# Patient Record
Sex: FEMALE | Race: ASIAN | NOT HISPANIC OR LATINO | Employment: UNEMPLOYED | URBAN - METROPOLITAN AREA
[De-identification: names, ages, dates, MRNs, and addresses within clinical notes are randomized per-mention and may not be internally consistent; named-entity substitution may affect disease eponyms.]

---

## 2019-04-29 ENCOUNTER — CONSULT (OUTPATIENT)
Dept: PERINATAL CARE | Facility: CLINIC | Age: 39
End: 2019-04-29
Payer: COMMERCIAL

## 2019-04-29 VITALS
BODY MASS INDEX: 18.82 KG/M2 | HEIGHT: 63 IN | WEIGHT: 106.2 LBS | SYSTOLIC BLOOD PRESSURE: 95 MMHG | HEART RATE: 78 BPM | DIASTOLIC BLOOD PRESSURE: 64 MMHG

## 2019-04-29 DIAGNOSIS — Z98.890 HISTORY OF MYOMECTOMY: ICD-10-CM

## 2019-04-29 DIAGNOSIS — O09.519 ADVANCED MATERNAL AGE, PRIMIGRAVIDA, ANTEPARTUM: ICD-10-CM

## 2019-04-29 DIAGNOSIS — Z31.69 ENCOUNTER FOR PRECONCEPTION CONSULTATION: Primary | ICD-10-CM

## 2019-04-29 PROCEDURE — 99204 OFFICE O/P NEW MOD 45 MIN: CPT | Performed by: OBSTETRICS & GYNECOLOGY

## 2019-07-05 ENCOUNTER — TELEPHONE (OUTPATIENT)
Dept: PERINATAL CARE | Facility: CLINIC | Age: 39
End: 2019-07-05

## 2019-07-05 NOTE — TELEPHONE ENCOUNTER
----- Message from Himanshu Fried RN sent at 7/5/2019  8:36 AM EDT -----  I spoke with [facility] and her   Her  states that they had the labs drawn with the prescription that was given to them by Dr Cosme Lees with [facility]  Thanks,   Elena Barillas     ----- Message -----  From: Paras Joshi MD  Sent: 7/3/2019   3:57 PM EDT  To: Himanshu Fried RN    Elba -   Can you call this patient to see who did her HBsAg test?  I ordered it for her in April but never got results, and the Health Dept called back as it apparently was positive, so I"m thinking her PCP did it, but I never saw results  Thank you! She is someone I saw for preconception  shaggy    ----- Message -----  From: Kristin Pavon RN  Sent: 7/3/2019   3:04 PM EDT  To: MD Kim Rodgers from the [place] DEPT of Health called with questions regarding [facility]  She would like to know if she is pregnant or not,also is her Hepatitis B acute or chronic   Please call her at [phone],GYH may leave a VM   Thanks so much,Linda

## 2020-03-05 ENCOUNTER — TELEPHONE (OUTPATIENT)
Dept: PERINATAL CARE | Facility: OTHER | Age: 40
End: 2020-03-05

## 2020-03-11 LAB
EXTERNAL ABO GROUPING: NORMAL
EXTERNAL CHLAMYDIA SCREEN: NEGATIVE
EXTERNAL GONORRHEA SCREEN: NEGATIVE
EXTERNAL HEMATOCRIT: 39.7 %
EXTERNAL HEMOGLOBIN: 12.8 G/DL
EXTERNAL HEPATITIS B SURFACE ANTIGEN: NORMAL
EXTERNAL HIV SCREEN: NORMAL
EXTERNAL HIV-1/2 AB-AG: NORMAL
EXTERNAL PLATELET COUNT: 195 K/ΜL
EXTERNAL RH FACTOR: POSITIVE
EXTERNAL RUBELLA IGG QUANTITATION: NORMAL
EXTERNAL SYPHILIS RPR SCREEN: NORMAL
HCV AB SER-ACNC: NORMAL

## 2020-03-22 ENCOUNTER — TELEMEDICINE (OUTPATIENT)
Dept: PERINATAL CARE | Facility: CLINIC | Age: 40
End: 2020-03-22
Payer: COMMERCIAL

## 2020-03-22 VITALS — WEIGHT: 102 LBS | BODY MASS INDEX: 18.07 KG/M2

## 2020-03-22 DIAGNOSIS — R63.6 UNDERWEIGHT: ICD-10-CM

## 2020-03-22 DIAGNOSIS — Z3A.08 8 WEEKS GESTATION OF PREGNANCY: ICD-10-CM

## 2020-03-22 DIAGNOSIS — O09.519 ADVANCED MATERNAL AGE, PRIMIGRAVIDA, ANTEPARTUM: ICD-10-CM

## 2020-03-22 DIAGNOSIS — O98.419 MATERNAL HBSAG (HEPATITIS B SURFACE ANTIGEN) CARRIER (HCC): ICD-10-CM

## 2020-03-22 DIAGNOSIS — B18.1 MATERNAL HBSAG (HEPATITIS B SURFACE ANTIGEN) CARRIER (HCC): ICD-10-CM

## 2020-03-22 DIAGNOSIS — Z31.69 ENCOUNTER FOR PRECONCEPTION CONSULTATION: Primary | ICD-10-CM

## 2020-03-22 DIAGNOSIS — O34.29 PREGNANCY WITH HISTORY OF UTERINE MYOMECTOMY: ICD-10-CM

## 2020-03-22 PROCEDURE — 99423 OL DIG E/M SVC 21+ MIN: CPT | Performed by: OBSTETRICS & GYNECOLOGY

## 2020-03-22 NOTE — ASSESSMENT & PLAN NOTE
Had genetic counseling previously (outside of pregnancy), discussed repeating in context of pregnancy to discuss genetic screening and diagnostic options  They will arrange through their local OB  Advised consideration of aspirin 162mg daily aspirin around 11-12 weeks to reduce risk of preeclampsia given moderate risk factors of nulliparity and AMA

## 2020-03-22 NOTE — LETTER
March 26, 2020     KEREN Jaquez and [address] [facility] [address]    Patient: Nancy Florez   YOB: 1980   Date of Visit: 3/22/2020       Dear Dr Khushi Hagan Recipients: Thank you for referring Nancy Florez to me for evaluation  Below are my notes for this consultation  If you have questions, please do not hesitate to call me  I look forward to following your patient along with you  Sincerely,        Caty Thompson MD        CC: No Recipients  Caty Thompson MD  3/22/2020  6:56 PM  Sign at close encounter  Virtual Regular Visit    Reason for visit is patient request of followup after initial preconception visit 4/2019  Tony Torrez reached out wanting to check in  Issa Jimenez is expecting! They've [facility] and have their intake 4/1/20  She had a dating scan 3/12/20, they showed me the images by telemed, CRL 0 33cm but printed image did not show the correlating [address] but they were told the dating was concordant by LMP (charted) suggesting 8w2d today w ED 10/30/20  Encounter provider Caty Thompson MD    Provider primarily located at [address] [place] [id number] [phone]      Recent Visits  No visits were found meeting these conditions  Showing recent visits within past 7 days and meeting all other requirements     Future Appointments  No visits were found meeting these conditions  Showing future appointments within next 150 days and meeting all other requirements      After connecting through [employer], the patient was identified by name and date of birth  Nancy Florez was informed that this is a telemedicine visit and that the visit is being conducted through [employer] which may not be secure and therefore, might not be HIPAA-compliant  My home office door was closed  No one else was in the room    She acknowledged consent and understanding of privacy and security of the video platform  The patient has agreed to participate and understands they can discontinue the visit at any time  Subjective  Tracey Beltrán is a 45 y o  female with complaints of mild nausea but tolerating the new pregnancy well  Past Medical History:   Diagnosis Date    Fibroid     Miscarriage     , early, did not require surgery  Past Surgical History:   Procedure Laterality Date    APPENDECTOMY      2004    MYOMECTOMY ABDOMINAL APPROACH  2019    In [facility] on 19  Working on getting operative note and will try to get it translated to [employer] [facility]  Indicates pathology negative for carcinoma  Symptoms were enlarging abdomen and bulk symptoms  Fibroid measurements on ultrasound image they showed me were 5 59x7 39cm though they believe dimensions >10cm  They were told to wait 6 months to conceive by one doctor and 12 months by another  Current Outpatient Medications   Medication Sig Dispense Refill    Ferrous Gluconate (IRON 27 PO) Take by mouth      Multiple Vitamins-Minerals (WOMENS MULTI PO) Take by mouth       No current facility-administered medications for this visit  No Known Allergies    OB History    Para Term  AB Living   2 0 0 0 1 0   SAB TAB Ectopic Multiple Live Births   1 0 0 0 0      # Outcome Date GA Lbr Moustapha/2nd Weight Sex Delivery Anes PTL Lv   2 Current            1 SAB 2006               Social History     Tobacco Use    Smoking status: Never Smoker    Smokeless tobacco: Never Used   Substance Use Topics    Alcohol use: Yes     Frequency: Monthly or less    Drug use: Never      Family History   Problem Relation Age of Onset    No Known Problems Mother     Heart disease Father      Assessment/Plan  Ms Janneth Celis is a 45y o  year-old Katherin Netters with an BRENDA of 10/30/2020, by Last Menstrual Period at [address] [place] [address] who desired telemedicine checkin prior to transition to local  care    By issue:    Problem List Items Addressed This Visit        Other    Pregnancy with history of uterine myomectomy     Will plan   Discussed timing recommendations previously, 37 weeks is reasonable, exact timing TBD by primary OB  Encounter for preconception consultation - Primary     Newly pregnant! Congratulated them  Advanced maternal age, primigravida, antepartum     Had genetic counseling previously (outside of pregnancy), discussed repeating in context of pregnancy to discuss genetic screening and diagnostic options  They will arrange through their local OB  Advised consideration of aspirin 162mg daily aspirin around 11-12 weeks to reduce risk of preeclampsia given moderate risk factors of nulliparity and AMA  Maternal HBsAg (hepatitis B surface antigen) carrier [facility])     She is not certain if HBV labs were drawn with initial prenatal labs; I encouraged her to remind team to check HBV viral load and HBeAg, most relevant in third trimester to help determine whether tenofovir is advised for reduction of vertical transmission  Inadvertently did not discuss HBIG and vaccination for the  but that will be addressed through her primary OB  8 weeks gestation of pregnancy     Doing well overall with nausea, tolerating prenatal vitamins, discussed strategies if iron becomes an issue with constipation  Discussed general timing of MFM visits which is likely different at [facility] and likely to be heavily impacted by COVID  They did very well by telemedicine and would do well with this in the future I suspect  Would anticipate NT, anatomic survey, and a growth assessment  Underweight     Discussed recommended weight gain of 28-40# this pregnancy based on starting BMI  I spent 35 minutes with the patient during this visit  Will route this note to her primary OB as requested       Kindra Ashley MD  Attending Physician, Maternal-Fetal Medicine  [facility]

## 2020-03-22 NOTE — ASSESSMENT & PLAN NOTE
She is not certain if HBV labs were drawn with initial prenatal labs; I encouraged her to remind team to check HBV viral load and HBeAg, most relevant in third trimester to help determine whether tenofovir is advised for reduction of vertical transmission  Inadvertently did not discuss HBIG and vaccination for the  but that will be addressed through her primary OB

## 2020-03-22 NOTE — ASSESSMENT & PLAN NOTE
Doing well overall with nausea, tolerating prenatal vitamins, discussed strategies if iron becomes an issue with constipation  Discussed general timing of MFM visits which is likely different at [facility] and likely to be heavily impacted by COVID  They did very well by telemedicine and would do well with this in the future I suspect  Would anticipate NT, anatomic survey, and a growth assessment

## 2020-03-22 NOTE — PROGRESS NOTES
Virtual Regular Visit    Reason for visit is patient request of followup after initial preconception visit 4/2019  Giulia Ceballos reached out wanting to check in  Isabella Sparks is expecting! They've established care at [facility] and have their intake 4/1/20  She had a dating scan 3/12/20, they showed me the images by telemed, CRL 0 33cm but printed image did not show the correlating [address] but they were told the dating was concordant by LMP (charted) suggesting 8w2d today w ED 10/30/20  Encounter provider Waldron Dubin, MD    Provider primarily located at [address] [place] [id number] [phone]      Recent Visits  No visits were found meeting these conditions  Showing recent visits within past 7 days and meeting all other requirements     Future Appointments  No visits were found meeting these conditions  Showing future appointments within next 150 days and meeting all other requirements      After connecting through [employer], the patient was identified by name and date of birth  Alma Artis was informed that this is a telemedicine visit and that the visit is being conducted through [employer] which may not be secure and therefore, might not be HIPAA-compliant  My home office door was closed  No one else was in the room  She acknowledged consent and understanding of privacy and security of the video platform  The patient has agreed to participate and understands they can discontinue the visit at any time  Subjective  Alma Artis is a 45 y o  female with complaints of mild nausea but tolerating the new pregnancy well  Past Medical History:   Diagnosis Date    Fibroid     Miscarriage     2006, early, did not require surgery  Past Surgical History:   Procedure Laterality Date    APPENDECTOMY      2004    MYOMECTOMY ABDOMINAL APPROACH  02/13/2019    In [facility] on 2/13/19    Working on getting operative note and will try to get it translated to [place]  Indicates pathology negative for carcinoma  Symptoms were enlarging abdomen and bulk symptoms  Fibroid measurements on ultrasound image they showed me were 5 59x7 39cm though they believe dimensions >10cm  They were told to wait 6 months to conceive by one doctor and 12 months by another  Current Outpatient Medications   Medication Sig Dispense Refill    Ferrous Gluconate (IRON 27 PO) Take by mouth      Multiple Vitamins-Minerals (WOMENS MULTI PO) Take by mouth       No current facility-administered medications for this visit  No Known Allergies    OB History    Para Term  AB Living   2 0 0 0 1 0   SAB TAB Ectopic Multiple Live Births   1 0 0 0 0      # Outcome Date GA Lbr Moustapha/2nd Weight Sex Delivery Anes PTL Lv   2 Current            1 2006               Social History     Tobacco Use    Smoking status: Never Smoker    Smokeless tobacco: Never Used   Substance Use Topics    Alcohol use: Yes     Frequency: Monthly or less    Drug use: Never      Family History   Problem Relation Age of Onset    No Known Problems Mother     Heart disease Father      Assessment/Plan  Ms Adam Jay is a 45y o  year-old Vastari Serve with an BRENDA of 10/30/2020, by Last Menstrual Period at [address] [place] [address] who desired telemedicine checkin prior to transition to local  care  By issue:    Problem List Items Addressed This Visit        Other    Pregnancy with history of uterine myomectomy     Will plan   Discussed timing recommendations previously, 37 weeks is reasonable, exact timing TBD by primary OB  Encounter for preconception consultation - Primary     Newly pregnant! Congratulated them  Advanced maternal age, primigravida, antepartum     Had genetic counseling previously (outside of pregnancy), discussed repeating in context of pregnancy to discuss genetic screening and diagnostic options    They will arrange through their local OB     Advised consideration of aspirin 162mg daily aspirin around 11-12 weeks to reduce risk of preeclampsia given moderate risk factors of nulliparity and AMA  Maternal HBsAg (hepatitis B surface antigen) carrier [facility])     She is not certain if HBV labs were drawn with initial prenatal labs; I encouraged her to remind team to check HBV viral load and HBeAg, most relevant in third trimester to help determine whether tenofovir is advised for reduction of vertical transmission  Inadvertently did not discuss HBIG and vaccination for the  but that will be addressed through her primary OB  8 weeks gestation of pregnancy     Doing well overall with nausea, tolerating prenatal vitamins, discussed strategies if iron becomes an issue with constipation  Discussed general timing of MFM visits which is likely different at [facility] and likely to be heavily impacted by COVID  They did very well by telemedicine and would do well with this in the future I suspect  Would anticipate NT, anatomic survey, and a growth assessment  Underweight     Discussed recommended weight gain of 28-40# this pregnancy based on starting BMI  I spent 35 minutes with the patient during this visit  Will route this note to her primary OB as requested       Mariely Carlin MD  Attending Physician, [place]

## 2020-03-22 NOTE — ASSESSMENT & PLAN NOTE
Will plan   Discussed timing recommendations previously, 37 weeks is reasonable, exact timing TBD by primary OB

## 2020-03-25 ENCOUNTER — TELEPHONE (OUTPATIENT)
Dept: PERINATAL CARE | Facility: CLINIC | Age: 40
End: 2020-03-25

## 2020-03-25 NOTE — TELEPHONE ENCOUNTER
Left message for pt to call us back with the fax number for her OB so that Dr Ralph Escalante can route her telemedicine visit note to them

## 2020-03-26 ENCOUNTER — TELEPHONE (OUTPATIENT)
Dept: PERINATAL CARE | Facility: OTHER | Age: 40
End: 2020-03-26

## 2020-03-26 NOTE — TELEPHONE ENCOUNTER
Pt's  called to give me the fax # [phone] for [facility]  And also the phone # [phone]

## 2020-03-27 ENCOUNTER — DOCUMENTATION (OUTPATIENT)
Dept: PERINATAL CARE | Facility: OTHER | Age: 40
End: 2020-03-27

## 2020-03-27 NOTE — PROGRESS NOTES
Received a staff message from Dr Jo Anthony requesting if I could call to speak with family to find out what their question they called the office about on 3/26/2020  I called and spoke with Freida Brewster   He states he saw Dr Jo Anthony for genetic testing and pre pregnancy consult in 2019  He said they were sent for a fertility specialist, and a TSH level was drawn in 2019  He states the level was 3 7 and was recommended to start on Synthroid 25 mcg daily  Tiera Gandhi states another TSH was drawn in 2019 and the level was 2 1  He states they were advised to not take the medication anymore  She had her LMP on 2020 and [facility] [place]  On 3/11/2020 she had a TSH level of 4 4  Tiera Gandhi states he was wondering if she should restart the synthroid and at what dose  Further through the conversation Tiera Gandhi states that he had her take 25mcg of Synthroid this morning "as he is a doctor and did his own research "    Tiera Gandhi is also looking for recommendations for a doctor for [facility] to continue her prenatal care with  He states she will need a  as she has a history of fibroid surgery  Tiera Gandhi is also requesting to only see Dr Jo Anthony at [place] [address], advised that in light of COVID 19, our physician schedules are ever changing and there is no guarantee of this  He was receptive  He states their next appointment is 2020 at [place] [address] in [place]  Tiera Gandhi states he would like a call from Dr Jo Anthony directly to address these concerns and questions  Message routed to DR Jo Anthony

## 2020-03-28 NOTE — PROGRESS NOTES
Called pt back, left message indicating no need to restart synthroid at this time and would defer management to her 4/1/20 [facility] appt at [facility]  Also indicated I don't have any specific recommendations on who to deliver her (no one specific per Dr Jeovanny Mohamud as it has been a while) and they could feel out the [facility] practice 4/1/20 at their upcoming appt    Raul Bryson MD

## 2020-04-07 ENCOUNTER — OB ABSTRACT (OUTPATIENT)
Dept: OBGYN CLINIC | Facility: MEDICAL CENTER | Age: 40
End: 2020-04-07

## 2020-04-07 DIAGNOSIS — O09.511 HIGH-RISK PREGNANCY, PRIMIGRAVIDA OF ADVANCED MATERNAL AGE IN FIRST TRIMESTER: ICD-10-CM

## 2020-04-07 DIAGNOSIS — E03.9 HYPOTHYROIDISM AFFECTING PREGNANCY IN FIRST TRIMESTER: Primary | ICD-10-CM

## 2020-04-07 DIAGNOSIS — O99.281 HYPOTHYROIDISM AFFECTING PREGNANCY IN FIRST TRIMESTER: Primary | ICD-10-CM

## 2020-04-07 RX ORDER — LEVOTHYROXINE SODIUM 0.03 MG/1
25 TABLET ORAL DAILY
COMMUNITY
End: 2020-07-08 | Stop reason: SDUPTHER

## 2020-04-07 RX ORDER — LEVOTHYROXINE SODIUM 0.03 MG/1
25 TABLET ORAL DAILY
Qty: 30 TABLET | Refills: 2 | Status: SHIPPED | OUTPATIENT
Start: 2020-04-07 | End: 2020-04-15 | Stop reason: SDUPTHER

## 2020-04-15 ENCOUNTER — INITIAL PRENATAL (OUTPATIENT)
Dept: OBGYN CLINIC | Facility: MEDICAL CENTER | Age: 40
End: 2020-04-15

## 2020-04-15 VITALS — BODY MASS INDEX: 18.25 KG/M2 | WEIGHT: 103 LBS | DIASTOLIC BLOOD PRESSURE: 62 MMHG | SYSTOLIC BLOOD PRESSURE: 90 MMHG

## 2020-04-15 DIAGNOSIS — O09.519 ADVANCED MATERNAL AGE, PRIMIGRAVIDA, ANTEPARTUM: ICD-10-CM

## 2020-04-15 DIAGNOSIS — E03.9 HYPOTHYROIDISM AFFECTING PREGNANCY IN FIRST TRIMESTER: ICD-10-CM

## 2020-04-15 DIAGNOSIS — O99.281 HYPOTHYROIDISM AFFECTING PREGNANCY IN FIRST TRIMESTER: ICD-10-CM

## 2020-04-15 DIAGNOSIS — Z3A.11 11 WEEKS GESTATION OF PREGNANCY: Primary | ICD-10-CM

## 2020-04-15 DIAGNOSIS — O34.29 PREGNANCY WITH HISTORY OF UTERINE MYOMECTOMY: ICD-10-CM

## 2020-04-15 DIAGNOSIS — Z34.91 FIRST TRIMESTER PREGNANCY: ICD-10-CM

## 2020-04-15 DIAGNOSIS — R63.6 UNDERWEIGHT: ICD-10-CM

## 2020-04-15 PROCEDURE — PNV: Performed by: OBSTETRICS & GYNECOLOGY

## 2020-04-17 ENCOUNTER — TELEPHONE (OUTPATIENT)
Dept: PERINATAL CARE | Facility: CLINIC | Age: 40
End: 2020-04-17

## 2020-04-19 PROBLEM — Z3A.12 12 WEEKS GESTATION OF PREGNANCY: Status: ACTIVE | Noted: 2020-03-22

## 2020-04-20 ENCOUNTER — ROUTINE PRENATAL (OUTPATIENT)
Dept: PERINATAL CARE | Facility: CLINIC | Age: 40
End: 2020-04-20
Payer: COMMERCIAL

## 2020-04-20 VITALS
HEIGHT: 63 IN | HEART RATE: 92 BPM | BODY MASS INDEX: 18.92 KG/M2 | WEIGHT: 106.8 LBS | TEMPERATURE: 97.2 F | DIASTOLIC BLOOD PRESSURE: 58 MMHG | SYSTOLIC BLOOD PRESSURE: 124 MMHG

## 2020-04-20 DIAGNOSIS — Z3A.12 12 WEEKS GESTATION OF PREGNANCY: ICD-10-CM

## 2020-04-20 DIAGNOSIS — O34.10 UTERINE FIBROID IN PREGNANCY: ICD-10-CM

## 2020-04-20 DIAGNOSIS — O98.419 MATERNAL HBSAG (HEPATITIS B SURFACE ANTIGEN) CARRIER (HCC): ICD-10-CM

## 2020-04-20 DIAGNOSIS — D25.9 UTERINE FIBROID IN PREGNANCY: ICD-10-CM

## 2020-04-20 DIAGNOSIS — O09.519 ADVANCED MATERNAL AGE, PRIMIGRAVIDA, ANTEPARTUM: ICD-10-CM

## 2020-04-20 DIAGNOSIS — R63.6 UNDERWEIGHT: ICD-10-CM

## 2020-04-20 DIAGNOSIS — B18.1 MATERNAL HBSAG (HEPATITIS B SURFACE ANTIGEN) CARRIER (HCC): ICD-10-CM

## 2020-04-20 DIAGNOSIS — O34.29 PREGNANCY WITH HISTORY OF UTERINE MYOMECTOMY: Primary | ICD-10-CM

## 2020-04-20 DIAGNOSIS — O09.511 HIGH-RISK PREGNANCY, PRIMIGRAVIDA OF ADVANCED MATERNAL AGE IN FIRST TRIMESTER: ICD-10-CM

## 2020-04-20 PROCEDURE — 76813 OB US NUCHAL MEAS 1 GEST: CPT | Performed by: OBSTETRICS & GYNECOLOGY

## 2020-04-20 PROCEDURE — 99212 OFFICE O/P EST SF 10 MIN: CPT | Performed by: OBSTETRICS & GYNECOLOGY

## 2020-04-20 PROCEDURE — 76801 OB US < 14 WKS SINGLE FETUS: CPT | Performed by: OBSTETRICS & GYNECOLOGY

## 2020-04-28 ENCOUNTER — PATIENT MESSAGE (OUTPATIENT)
Dept: PERINATAL CARE | Facility: CLINIC | Age: 40
End: 2020-04-28

## 2020-04-28 LAB — TSH SERPL-ACNC: 1.65 MIU/L

## 2020-05-15 ENCOUNTER — ROUTINE PRENATAL (OUTPATIENT)
Dept: OBGYN CLINIC | Facility: MEDICAL CENTER | Age: 40
End: 2020-05-15

## 2020-05-15 ENCOUNTER — APPOINTMENT (OUTPATIENT)
Dept: LAB | Facility: MEDICAL CENTER | Age: 40
End: 2020-05-15
Payer: COMMERCIAL

## 2020-05-15 ENCOUNTER — TRANSCRIBE ORDERS (OUTPATIENT)
Dept: LAB | Facility: MEDICAL CENTER | Age: 40
End: 2020-05-15

## 2020-05-15 VITALS — BODY MASS INDEX: 18.58 KG/M2 | WEIGHT: 104.9 LBS | SYSTOLIC BLOOD PRESSURE: 92 MMHG | DIASTOLIC BLOOD PRESSURE: 62 MMHG

## 2020-05-15 DIAGNOSIS — Z36.9 UNSPECIFIED ANTENATAL SCREENING: ICD-10-CM

## 2020-05-15 DIAGNOSIS — Z33.1 PREGNANT STATE, INCIDENTAL: Primary | ICD-10-CM

## 2020-05-15 DIAGNOSIS — E03.9 HYPOTHYROIDISM AFFECTING PREGNANCY IN SECOND TRIMESTER: ICD-10-CM

## 2020-05-15 DIAGNOSIS — Z33.1 PREGNANT STATE, INCIDENTAL: ICD-10-CM

## 2020-05-15 DIAGNOSIS — O34.10 UTERINE FIBROID IN PREGNANCY: ICD-10-CM

## 2020-05-15 DIAGNOSIS — D25.9 UTERINE FIBROID IN PREGNANCY: ICD-10-CM

## 2020-05-15 DIAGNOSIS — O99.282 HYPOTHYROIDISM AFFECTING PREGNANCY IN SECOND TRIMESTER: ICD-10-CM

## 2020-05-15 DIAGNOSIS — O09.519 ADVANCED MATERNAL AGE, PRIMIGRAVIDA, ANTEPARTUM: Primary | ICD-10-CM

## 2020-05-15 PROCEDURE — PNV: Performed by: OBSTETRICS & GYNECOLOGY

## 2020-05-15 PROCEDURE — 36415 COLL VENOUS BLD VENIPUNCTURE: CPT

## 2020-05-15 PROCEDURE — 82105 ALPHA-FETOPROTEIN SERUM: CPT

## 2020-05-19 ENCOUNTER — TELEPHONE (OUTPATIENT)
Dept: PERINATAL CARE | Facility: CLINIC | Age: 40
End: 2020-05-19

## 2020-05-19 LAB
2ND TRIMESTER 4 SCREEN SERPL-IMP: NORMAL
AFP ADJ MOM SERPL: 0.84
AFP INTERP AMN-IMP: NORMAL
AFP INTERP SERPL-IMP: NORMAL
AFP INTERP SERPL-IMP: NORMAL
AFP SERPL-MCNC: 34.8 NG/ML
AGE AT DELIVERY: 39.7 YR
GA METHOD: NORMAL
GA: 16 WEEKS
IDDM PATIENT QL: NO
MULTIPLE PREGNANCY: NO
NEURAL TUBE DEFECT RISK FETUS: NORMAL %

## 2020-06-11 ENCOUNTER — TELEPHONE (OUTPATIENT)
Dept: PERINATAL CARE | Facility: CLINIC | Age: 40
End: 2020-06-11

## 2020-06-11 PROBLEM — Z3A.20 20 WEEKS GESTATION OF PREGNANCY: Status: ACTIVE | Noted: 2020-03-22

## 2020-06-11 PROBLEM — B18.1 MATERNAL HBSAG (HEPATITIS B SURFACE ANTIGEN) CARRIER (HCC): Status: RESOLVED | Noted: 2020-04-20 | Resolved: 2020-06-11

## 2020-06-11 PROBLEM — O98.419 MATERNAL HBSAG (HEPATITIS B SURFACE ANTIGEN) CARRIER (HCC): Status: RESOLVED | Noted: 2020-04-20 | Resolved: 2020-06-11

## 2020-06-12 ENCOUNTER — ROUTINE PRENATAL (OUTPATIENT)
Dept: OBGYN CLINIC | Facility: MEDICAL CENTER | Age: 40
End: 2020-06-12

## 2020-06-12 ENCOUNTER — APPOINTMENT (OUTPATIENT)
Dept: LAB | Facility: MEDICAL CENTER | Age: 40
End: 2020-06-12
Payer: COMMERCIAL

## 2020-06-12 ENCOUNTER — ROUTINE PRENATAL (OUTPATIENT)
Dept: PERINATAL CARE | Facility: CLINIC | Age: 40
End: 2020-06-12
Payer: COMMERCIAL

## 2020-06-12 VITALS — DIASTOLIC BLOOD PRESSURE: 58 MMHG | BODY MASS INDEX: 19.33 KG/M2 | WEIGHT: 109.1 LBS | SYSTOLIC BLOOD PRESSURE: 100 MMHG

## 2020-06-12 VITALS
HEART RATE: 84 BPM | HEIGHT: 63 IN | TEMPERATURE: 98.8 F | SYSTOLIC BLOOD PRESSURE: 102 MMHG | DIASTOLIC BLOOD PRESSURE: 57 MMHG | WEIGHT: 111.4 LBS | BODY MASS INDEX: 19.74 KG/M2

## 2020-06-12 DIAGNOSIS — O34.10 UTERINE FIBROID IN PREGNANCY: ICD-10-CM

## 2020-06-12 DIAGNOSIS — O09.519 ADVANCED MATERNAL AGE, PRIMIGRAVIDA, ANTEPARTUM: ICD-10-CM

## 2020-06-12 DIAGNOSIS — Z34.92 SECOND TRIMESTER PREGNANCY: Primary | ICD-10-CM

## 2020-06-12 DIAGNOSIS — I51.9 MYXEDEMA HEART DISEASE: ICD-10-CM

## 2020-06-12 DIAGNOSIS — O99.281 MATERNAL HYPERTHYROIDISM, ANTEPARTUM, FIRST TRIMESTER: Primary | ICD-10-CM

## 2020-06-12 DIAGNOSIS — O34.29 PREGNANCY WITH HISTORY OF UTERINE MYOMECTOMY: Primary | ICD-10-CM

## 2020-06-12 DIAGNOSIS — Z36.86 ENCOUNTER FOR ANTENATAL SCREENING FOR CERVICAL LENGTH: ICD-10-CM

## 2020-06-12 DIAGNOSIS — Z36.3 ENCOUNTER FOR ANTENATAL SCREENING FOR MALFORMATIONS: ICD-10-CM

## 2020-06-12 DIAGNOSIS — E05.90 MATERNAL HYPERTHYROIDISM, ANTEPARTUM, FIRST TRIMESTER: Primary | ICD-10-CM

## 2020-06-12 DIAGNOSIS — Z3A.20 20 WEEKS GESTATION OF PREGNANCY: ICD-10-CM

## 2020-06-12 DIAGNOSIS — E03.9 MYXEDEMA HEART DISEASE: ICD-10-CM

## 2020-06-12 DIAGNOSIS — D25.9 UTERINE FIBROID IN PREGNANCY: ICD-10-CM

## 2020-06-12 LAB
AMORPH URATE CRY URNS QL MICRO: ABNORMAL /HPF
BACTERIA UR QL AUTO: ABNORMAL /HPF
BILIRUB UR QL STRIP: NEGATIVE
CLARITY UR: CLEAR
COLOR UR: YELLOW
GLUCOSE UR STRIP-MCNC: NEGATIVE MG/DL
HGB UR QL STRIP.AUTO: NEGATIVE
KETONES UR STRIP-MCNC: ABNORMAL MG/DL
LEUKOCYTE ESTERASE UR QL STRIP: NEGATIVE
NITRITE UR QL STRIP: NEGATIVE
NON-SQ EPI CELLS URNS QL MICRO: ABNORMAL /HPF
PH UR STRIP.AUTO: 6.5 [PH]
PROT UR STRIP-MCNC: NEGATIVE MG/DL
RBC #/AREA URNS AUTO: ABNORMAL /HPF
SP GR UR STRIP.AUTO: 1.02 (ref 1–1.03)
TSH SERPL DL<=0.05 MIU/L-ACNC: 2.59 UIU/ML (ref 0.36–3.74)
UROBILINOGEN UR QL STRIP.AUTO: 0.2 E.U./DL
WBC #/AREA URNS AUTO: ABNORMAL /HPF

## 2020-06-12 PROCEDURE — 81001 URINALYSIS AUTO W/SCOPE: CPT | Performed by: OBSTETRICS & GYNECOLOGY

## 2020-06-12 PROCEDURE — 76817 TRANSVAGINAL US OBSTETRIC: CPT | Performed by: OBSTETRICS & GYNECOLOGY

## 2020-06-12 PROCEDURE — PNV: Performed by: OBSTETRICS & GYNECOLOGY

## 2020-06-12 PROCEDURE — 36415 COLL VENOUS BLD VENIPUNCTURE: CPT

## 2020-06-12 PROCEDURE — 76811 OB US DETAILED SNGL FETUS: CPT | Performed by: OBSTETRICS & GYNECOLOGY

## 2020-06-12 PROCEDURE — 84443 ASSAY THYROID STIM HORMONE: CPT

## 2020-06-12 PROCEDURE — 87086 URINE CULTURE/COLONY COUNT: CPT | Performed by: OBSTETRICS & GYNECOLOGY

## 2020-06-12 PROCEDURE — 99212 OFFICE O/P EST SF 10 MIN: CPT | Performed by: OBSTETRICS & GYNECOLOGY

## 2020-06-13 LAB — BACTERIA UR CULT: NORMAL

## 2020-07-07 DIAGNOSIS — Z34.92 SECOND TRIMESTER PREGNANCY: Primary | ICD-10-CM

## 2020-07-07 RX ORDER — LEVOTHYROXINE SODIUM 0.03 MG/1
25 TABLET ORAL DAILY
Qty: 30 TABLET | Refills: 1 | Status: CANCELLED | OUTPATIENT
Start: 2020-07-07 | End: 2020-08-06

## 2020-07-08 DIAGNOSIS — E03.9 HYPOTHYROIDISM AFFECTING PREGNANCY IN SECOND TRIMESTER: Primary | ICD-10-CM

## 2020-07-08 DIAGNOSIS — O99.282 HYPOTHYROIDISM AFFECTING PREGNANCY IN SECOND TRIMESTER: Primary | ICD-10-CM

## 2020-07-08 RX ORDER — LEVOTHYROXINE SODIUM 0.03 MG/1
25 TABLET ORAL DAILY
Qty: 30 TABLET | Refills: 6 | Status: SHIPPED | OUTPATIENT
Start: 2020-07-08 | End: 2021-02-18

## 2020-07-10 ENCOUNTER — ROUTINE PRENATAL (OUTPATIENT)
Dept: OBGYN CLINIC | Facility: MEDICAL CENTER | Age: 40
End: 2020-07-10

## 2020-07-10 VITALS — DIASTOLIC BLOOD PRESSURE: 60 MMHG | SYSTOLIC BLOOD PRESSURE: 96 MMHG | WEIGHT: 112 LBS | BODY MASS INDEX: 19.84 KG/M2

## 2020-07-10 DIAGNOSIS — Z3A.24 24 WEEKS GESTATION OF PREGNANCY: ICD-10-CM

## 2020-07-10 DIAGNOSIS — O09.519 ADVANCED MATERNAL AGE, PRIMIGRAVIDA, ANTEPARTUM: Primary | ICD-10-CM

## 2020-07-10 PROCEDURE — PNV: Performed by: NURSE PRACTITIONER

## 2020-07-10 RX ORDER — ASPIRIN 81 MG/1
162 TABLET, CHEWABLE ORAL DAILY
COMMUNITY
End: 2020-11-17

## 2020-07-10 NOTE — PROGRESS NOTES
Suyapa Pierce is a 45y o  year old  at 24w0d for routine prenatal visit    + FM, no vaginal bleeding, contractions, or LOF  Complaints: No   Most recent ultrasound and labs reviewed  Had level 2 on , having a boy! States Mfm to call them with next appt  Needs glucose and cbc at next visit  Spouse is a DR  They come from [facility]     Taking ldasa, urine dip neg in office today

## 2020-08-05 PROBLEM — Z36.86 ENCOUNTER FOR ANTENATAL SCREENING FOR CERVICAL LENGTH: Status: RESOLVED | Noted: 2020-06-12 | Resolved: 2020-08-05

## 2020-08-05 PROBLEM — Z36.3 ENCOUNTER FOR ANTENATAL SCREENING FOR MALFORMATIONS: Status: RESOLVED | Noted: 2020-06-12 | Resolved: 2020-08-05

## 2020-08-05 NOTE — PATIENT INSTRUCTIONS
Thank you for choosing us for your  care today  If you have any questions about your ultrasound or care, please do not hesitate to contact us or your primary obstetrician  Some general instructions for your pregnancy are:     Exercise: Aim for 22 minutes per day (150 minutes per week!) of regular exercise  This is obviously hard to do during a pandemic but walking is great   Nutrition: aim for calcium-rich and iron-rich foods as well as healthy sources of protein  This will help you gain a healthy amount of weight   Protect against the flu: get yourself and your entire household vaccinated against influenza   Protect against coronavirus: practice social distancing, wear a mask in public, and wash your hands often  This virus can be spread easily by people without symptoms  Notify your primary care doctor if you have any symptoms including cough, shortness of breath or difficulty breathing, fever, chills, muscle pain, sore throat, or new loss of taste or smell   Learn about Preeclampsia: preeclampsia is a common, serious complication in pregnancy  A blood pressure of 140mmHg (top number or systolic) OR 45NENA (bottom number or diastolic) is not normal and needs evaluation by your doctor   If you smoke, try to reduce how many cigarettes you smoke or quit completely  Do not vape   Other warning signs to watch out for in pregnancy or postpartum: chest pain, obstructed breathing or shortness of breath, seizures, thoughts of hurting yourself or your baby, bleeding, a painful or swollen leg, fever, or headache ([facility] POST-BIRTH Warning Signs campaign)  If these happen call 911  Itching is also not normal in pregnancy and if you experience this, especially over your hands and feet, potentially worse at night, notify your doctors

## 2020-08-06 ENCOUNTER — TELEPHONE (OUTPATIENT)
Dept: PERINATAL CARE | Facility: CLINIC | Age: 40
End: 2020-08-06

## 2020-08-06 NOTE — TELEPHONE ENCOUNTER
Attempted to reach patient by phone and left voicemail to confirm appointment for MFM ultrasound  1 support person ( must be over the age of 15) may accompany you for your appointment  If you or your support person have traveled outside the state in the past 2 weeks, please call and notify our office today #[phone]  You and your support person must wear a mask ,covering nose and mouth,during your entire visit  You and your support person will have temperature screened upon arrival     To minimize your exposure in our waiting room, please call our office prior to entering the building  Check in and rooming questions will be done via phone  We will give you directions when to enter for your appointment  Inside office # provided:  [facility] line: [phone]  [place] line:  [phone]  [facility] line:  [address] line:  [phone]  [facility]endo[facility] line:  [phone]  [place] line:  [phone]    IF you are not feeling well- cough, fever, shortness of breath or any flu like symptoms, contact your primary care physician or [facility] Sirena Valle    Any questions with these instructions please call Maternal Fetal Medicine nurse line today @ # [phone]

## 2020-08-07 ENCOUNTER — ULTRASOUND (OUTPATIENT)
Dept: PERINATAL CARE | Facility: OTHER | Age: 40
End: 2020-08-07
Payer: COMMERCIAL

## 2020-08-07 ENCOUNTER — ROUTINE PRENATAL (OUTPATIENT)
Dept: OBGYN CLINIC | Facility: MEDICAL CENTER | Age: 40
End: 2020-08-07
Payer: COMMERCIAL

## 2020-08-07 VITALS — BODY MASS INDEX: 20.37 KG/M2 | WEIGHT: 115 LBS | DIASTOLIC BLOOD PRESSURE: 50 MMHG | SYSTOLIC BLOOD PRESSURE: 96 MMHG

## 2020-08-07 VITALS
TEMPERATURE: 97.8 F | WEIGHT: 115.52 LBS | SYSTOLIC BLOOD PRESSURE: 96 MMHG | DIASTOLIC BLOOD PRESSURE: 60 MMHG | HEIGHT: 63 IN | HEART RATE: 96 BPM | BODY MASS INDEX: 20.47 KG/M2

## 2020-08-07 DIAGNOSIS — Z3A.28 28 WEEKS GESTATION OF PREGNANCY: ICD-10-CM

## 2020-08-07 DIAGNOSIS — D25.9 UTERINE FIBROID IN PREGNANCY: Primary | ICD-10-CM

## 2020-08-07 DIAGNOSIS — O34.10 UTERINE FIBROID IN PREGNANCY: Primary | ICD-10-CM

## 2020-08-07 DIAGNOSIS — O34.29 PREGNANCY WITH HISTORY OF UTERINE MYOMECTOMY: ICD-10-CM

## 2020-08-07 DIAGNOSIS — Z36.89 ENCOUNTER FOR ULTRASOUND TO CHECK FETAL GROWTH: ICD-10-CM

## 2020-08-07 DIAGNOSIS — O09.519 ADVANCED MATERNAL AGE, PRIMIGRAVIDA, ANTEPARTUM: ICD-10-CM

## 2020-08-07 DIAGNOSIS — O09.519 ADVANCED MATERNAL AGE, PRIMIGRAVIDA, ANTEPARTUM: Primary | ICD-10-CM

## 2020-08-07 LAB
BASOPHILS # BLD MANUAL: 0 THOUSAND/UL (ref 0–0.1)
BASOPHILS NFR MAR MANUAL: 0 % (ref 0–1)
EOSINOPHIL # BLD MANUAL: 0 THOUSAND/UL (ref 0–0.4)
EOSINOPHIL NFR BLD MANUAL: 0 % (ref 0–6)
ERYTHROCYTE [DISTWIDTH] IN BLOOD BY AUTOMATED COUNT: 13.2 % (ref 11.6–15.1)
GLUCOSE 1H P 50 G GLC PO SERPL-MCNC: 112 MG/DL
HCT VFR BLD AUTO: 36.4 % (ref 34.8–46.1)
HGB BLD-MCNC: 11.9 G/DL (ref 11.5–15.4)
LYMPHOCYTES # BLD AUTO: 0.45 THOUSAND/UL (ref 0.6–4.47)
LYMPHOCYTES # BLD AUTO: 4 % (ref 14–44)
MCH RBC QN AUTO: 31.4 PG (ref 26.8–34.3)
MCHC RBC AUTO-ENTMCNC: 32.7 G/DL (ref 31.4–37.4)
MCV RBC AUTO: 96 FL (ref 82–98)
METAMYELOCYTES NFR BLD MANUAL: 1 % (ref 0–1)
MONOCYTES # BLD AUTO: 0.8 THOUSAND/UL (ref 0–1.22)
MONOCYTES NFR BLD: 7 % (ref 4–12)
NEUTROPHILS # BLD MANUAL: 8.97 THOUSAND/UL (ref 1.85–7.62)
NEUTS BAND NFR BLD MANUAL: 1 % (ref 0–8)
NEUTS SEG NFR BLD AUTO: 78 % (ref 43–75)
NRBC BLD AUTO-RTO: 0 /100 WBCS
PLATELET # BLD AUTO: 196 THOUSANDS/UL (ref 149–390)
PLATELET BLD QL SMEAR: ADEQUATE
PMV BLD AUTO: 11.2 FL (ref 8.9–12.7)
RBC # BLD AUTO: 3.79 MILLION/UL (ref 3.81–5.12)
RBC MORPH BLD: NORMAL
VARIANT LYMPHS # BLD AUTO: 9 %
WBC # BLD AUTO: 11.36 THOUSAND/UL (ref 4.31–10.16)

## 2020-08-07 PROCEDURE — 85007 BL SMEAR W/DIFF WBC COUNT: CPT | Performed by: NURSE PRACTITIONER

## 2020-08-07 PROCEDURE — 76816 OB US FOLLOW-UP PER FETUS: CPT | Performed by: OBSTETRICS & GYNECOLOGY

## 2020-08-07 PROCEDURE — 99212 OFFICE O/P EST SF 10 MIN: CPT | Performed by: OBSTETRICS & GYNECOLOGY

## 2020-08-07 PROCEDURE — 36415 COLL VENOUS BLD VENIPUNCTURE: CPT | Performed by: NURSE PRACTITIONER

## 2020-08-07 PROCEDURE — 82950 GLUCOSE TEST: CPT | Performed by: NURSE PRACTITIONER

## 2020-08-07 PROCEDURE — 85027 COMPLETE CBC AUTOMATED: CPT | Performed by: NURSE PRACTITIONER

## 2020-08-07 PROCEDURE — PNV: Performed by: NURSE PRACTITIONER

## 2020-08-07 NOTE — PROGRESS NOTES
Vijay Morse is a 45y o  year old  at 28w0d for routine prenatal visit    + FM, no vaginal bleeding, contractions, or LOF  Complaints: No feels good  Most recent ultrasound and labs reviewed  Looking forward to visit with Dr Arian Herrera today  Spouse would like to know delivery date asap so he can reschedule his dental appts  Requesting Dr Jacquelyn Ferguson or Cancel for delivery if possible  Cbc and glucose drawn today and given stork form to complete  Fkc, reviewed

## 2020-08-07 NOTE — PROGRESS NOTES
[address] [facility] Domi Alarcon was seen today at 28w0d for fetal growth assessment ultrasound  See ultrasound report under "OB Procedures" tab  Please don't hesitate to contact our office with any concerns or questions    Steve Summers MD

## 2020-08-13 ENCOUNTER — TELEPHONE (OUTPATIENT)
Dept: OBGYN CLINIC | Facility: MEDICAL CENTER | Age: 40
End: 2020-08-13

## 2020-08-13 ENCOUNTER — PATIENT MESSAGE (OUTPATIENT)
Dept: PERINATAL CARE | Facility: CLINIC | Age: 40
End: 2020-08-13

## 2020-08-13 NOTE — TELEPHONE ENCOUNTER
----- Message from Jesse Gonzalez sent at 8/13/2020 11:05 AM EDT -----  Theotis Comings with Liliam Funez at L&D  Pt is scheduled for 10/9/20 at 0730 with Dr Keshawn Stallings  Lmovm on  vm with date and time  ----- Message -----  From: Sheila Peters MD  Sent: 8/11/2020  12:06 PM EDT  To: Caty Breaux is on day she turns 37 weeks - Oct 9   Otherwise you can schedule for me any time the following week   Thanks   ----- Message -----  From: Jesse Gonzalez  Sent: 8/7/2020  10:37 AM EDT  To: Sheila Mittal MD    Mfm wants pt to have a c/s at 37-38 weeks  Pt prefers you or Guera Lala  What dates are you SOD in Oct or Guera Raejin and I can call up  I do not have October's schedule im sorry for asking!

## 2020-09-01 ENCOUNTER — ROUTINE PRENATAL (OUTPATIENT)
Dept: OBGYN CLINIC | Facility: CLINIC | Age: 40
End: 2020-09-01

## 2020-09-01 VITALS — DIASTOLIC BLOOD PRESSURE: 60 MMHG | BODY MASS INDEX: 20.62 KG/M2 | WEIGHT: 116.4 LBS | SYSTOLIC BLOOD PRESSURE: 100 MMHG

## 2020-09-01 DIAGNOSIS — O34.29 PREGNANCY WITH HISTORY OF UTERINE MYOMECTOMY: ICD-10-CM

## 2020-09-01 DIAGNOSIS — O09.519 ADVANCED MATERNAL AGE, PRIMIGRAVIDA, ANTEPARTUM: Primary | ICD-10-CM

## 2020-09-01 PROCEDURE — PNV: Performed by: OBSTETRICS & GYNECOLOGY

## 2020-09-03 NOTE — PROGRESS NOTES
Harish Redding is a 45y o  year old  at 32w8d for routine prenatal visit    + FM, no vaginal bleeding, contractions, or LOF  Complaints: No   Most recent ultrasound and labs reviewed    We discussed recommendation for delivery from 37weeks to 38 weeks 6 days secondary to prior open myomectomy   Currently scheduled for  for  section  [facility] and labor precautions given

## 2020-09-08 ENCOUNTER — TELEPHONE (OUTPATIENT)
Dept: OBGYN CLINIC | Facility: MEDICAL CENTER | Age: 40
End: 2020-09-08

## 2020-09-08 NOTE — TELEPHONE ENCOUNTER
----- Message from Rk Gurrola sent at 9/8/2020  2:31 PM EDT -----  Spoke with Giovanni Pickering at L&D  Pt is scheduled for 10/15/20 at 0730 with Dr Faviola Crow   Spoke with  he is aware    ----- Message -----  From: Rk Gurrola  Sent: 9/8/2020  10:48 AM EDT  To: Rk Gurrola    Pt would like to r/s to 10/15 with dr Lynne Spence

## 2020-09-14 ENCOUNTER — TELEPHONE (OUTPATIENT)
Dept: PERINATAL CARE | Facility: OTHER | Age: 40
End: 2020-09-14

## 2020-09-14 NOTE — TELEPHONE ENCOUNTER
Attempted to reach patient by phone and left voicemail to confirm appointment for MFM ultrasound  1 support person ( must be over the age of 15) may accompany you for your appointment  If you or your support person have traveled outside the state in the past 2 weeks, please call and notify our office today #[phone]  You and your support person must wear a mask ,covering nose and mouth,during your entire visit  You and your support person will have temperature screened upon arrival     To minimize your exposure in our waiting room, please call our office prior to entering the building  Check in and rooming questions will be done via phone  We will give you directions when to enter for your appointment  Inside office # provided:  [place] line: [phone]  [facility] line:  [phone]  [facility] line:  [address] line:  [phone]  [facility] line:  [phone]  [place] line:  [phone]    IF you are not feeling well- cough, fever, shortness of breath or any flu like symptoms, contact your primary care physician or [address] [place] [address] Chino Roman    Any questions with these instructions please call Maternal Fetal Medicine nurse line today @ # [phone]

## 2020-09-15 ENCOUNTER — ULTRASOUND (OUTPATIENT)
Dept: PERINATAL CARE | Facility: OTHER | Age: 40
End: 2020-09-15
Payer: COMMERCIAL

## 2020-09-15 ENCOUNTER — ROUTINE PRENATAL (OUTPATIENT)
Dept: OBGYN CLINIC | Facility: CLINIC | Age: 40
End: 2020-09-15
Payer: COMMERCIAL

## 2020-09-15 VITALS — SYSTOLIC BLOOD PRESSURE: 108 MMHG | BODY MASS INDEX: 20.83 KG/M2 | DIASTOLIC BLOOD PRESSURE: 60 MMHG | WEIGHT: 117.6 LBS

## 2020-09-15 VITALS
SYSTOLIC BLOOD PRESSURE: 109 MMHG | BODY MASS INDEX: 20.98 KG/M2 | DIASTOLIC BLOOD PRESSURE: 75 MMHG | WEIGHT: 118.4 LBS | TEMPERATURE: 96.4 F | HEART RATE: 87 BPM | HEIGHT: 63 IN

## 2020-09-15 DIAGNOSIS — O34.29 PREGNANCY WITH HISTORY OF UTERINE MYOMECTOMY: Primary | ICD-10-CM

## 2020-09-15 DIAGNOSIS — Z23 NEED FOR TDAP VACCINATION: ICD-10-CM

## 2020-09-15 DIAGNOSIS — O09.519 ADVANCED MATERNAL AGE, PRIMIGRAVIDA, ANTEPARTUM: ICD-10-CM

## 2020-09-15 DIAGNOSIS — Z23 NEED FOR INFLUENZA VACCINATION: ICD-10-CM

## 2020-09-15 PROBLEM — Z3A.33 33 WEEKS GESTATION OF PREGNANCY: Status: ACTIVE | Noted: 2020-03-22

## 2020-09-15 PROCEDURE — PNV: Performed by: OBSTETRICS & GYNECOLOGY

## 2020-09-15 PROCEDURE — 90715 TDAP VACCINE 7 YRS/> IM: CPT | Performed by: OBSTETRICS & GYNECOLOGY

## 2020-09-15 PROCEDURE — 76816 OB US FOLLOW-UP PER FETUS: CPT | Performed by: OBSTETRICS & GYNECOLOGY

## 2020-09-15 PROCEDURE — 90686 IIV4 VACC NO PRSV 0.5 ML IM: CPT | Performed by: OBSTETRICS & GYNECOLOGY

## 2020-09-15 PROCEDURE — 99212 OFFICE O/P EST SF 10 MIN: CPT | Performed by: OBSTETRICS & GYNECOLOGY

## 2020-09-15 PROCEDURE — 90472 IMMUNIZATION ADMIN EACH ADD: CPT | Performed by: OBSTETRICS & GYNECOLOGY

## 2020-09-15 PROCEDURE — 90471 IMMUNIZATION ADMIN: CPT | Performed by: OBSTETRICS & GYNECOLOGY

## 2020-09-15 NOTE — PROGRESS NOTES
[address]: Ms Marky Pradhan was seen today at 33w4d for fetal growth assessment ultrasound  See ultrasound report under "OB Procedures" tab  Please don't hesitate to contact our office with any concerns or questions    Mahnaz Grewal MD

## 2020-09-15 NOTE — PROGRESS NOTES
Dairan Gutierrez is a 45y o  year old  at 33w4d for routine prenatal visit    + FM, no vaginal bleeding, contractions, or LOF  Complaints: No   Most recent ultrasound and labs reviewed    Scheduled for surgery October 15 with Dr Richie Barrett and flu shot given today   [address] and labor precautions reviewed   Birth plan signed

## 2020-09-15 NOTE — PATIENT INSTRUCTIONS
Thank you for choosing us for your  care today  If you have any questions about your ultrasound or care, please do not hesitate to contact us or your primary obstetrician  Some general instructions for your pregnancy are:     Exercise: Aim for 22 minutes per day (150 minutes per week!) of regular exercise  This is obviously hard to do during a pandemic but walking is great   Nutrition: aim for calcium-rich and iron-rich foods as well as healthy sources of protein  This will help you gain a healthy amount of weight   Protect against the flu: get yourself and your entire household vaccinated against influenza   Protect against coronavirus: practice social distancing, wear a mask in public, and wash your hands often  This virus can be spread easily by people without symptoms  Notify your primary care doctor if you have any symptoms including cough, shortness of breath or difficulty breathing, fever, chills, muscle pain, sore throat, or new loss of taste or smell   Learn about Preeclampsia: preeclampsia is a common, serious complication in pregnancy  A blood pressure of 140mmHg (top number or systolic) OR 78KPXS (bottom number or diastolic) is not normal and needs evaluation by your doctor   If you smoke, try to reduce how many cigarettes you smoke or quit completely  Do not vape   Other warning signs to watch out for in pregnancy or postpartum: chest pain, obstructed breathing or shortness of breath, seizures, thoughts of hurting yourself or your baby, bleeding, a painful or swollen leg, fever, or headache ([facility] POST-BIRTH Warning Signs campaign)  If these happen call 911  Itching is also not normal in pregnancy and if you experience this, especially over your hands and feet, potentially worse at night, notify your doctors

## 2020-10-01 ENCOUNTER — ROUTINE PRENATAL (OUTPATIENT)
Dept: OBGYN CLINIC | Facility: CLINIC | Age: 40
End: 2020-10-01

## 2020-10-01 VITALS — WEIGHT: 122.2 LBS | BODY MASS INDEX: 21.65 KG/M2 | SYSTOLIC BLOOD PRESSURE: 100 MMHG | DIASTOLIC BLOOD PRESSURE: 68 MMHG

## 2020-10-01 DIAGNOSIS — O34.29 PREGNANCY WITH HISTORY OF UTERINE MYOMECTOMY: ICD-10-CM

## 2020-10-01 DIAGNOSIS — Z3A.35 35 WEEKS GESTATION OF PREGNANCY: Primary | ICD-10-CM

## 2020-10-01 PROCEDURE — 87653 STREP B DNA AMP PROBE: CPT | Performed by: OBSTETRICS & GYNECOLOGY

## 2020-10-01 PROCEDURE — PNV: Performed by: OBSTETRICS & GYNECOLOGY

## 2020-10-04 LAB — GP B STREP DNA SPEC QL NAA+PROBE: NORMAL

## 2020-10-07 ENCOUNTER — ROUTINE PRENATAL (OUTPATIENT)
Dept: OBGYN CLINIC | Facility: CLINIC | Age: 40
End: 2020-10-07

## 2020-10-07 VITALS — WEIGHT: 120.3 LBS | DIASTOLIC BLOOD PRESSURE: 75 MMHG | BODY MASS INDEX: 21.31 KG/M2 | SYSTOLIC BLOOD PRESSURE: 100 MMHG

## 2020-10-07 DIAGNOSIS — O09.519 ADVANCED MATERNAL AGE, PRIMIGRAVIDA, ANTEPARTUM: ICD-10-CM

## 2020-10-07 DIAGNOSIS — R63.6 UNDERWEIGHT: ICD-10-CM

## 2020-10-07 DIAGNOSIS — O34.29 PREGNANCY WITH HISTORY OF UTERINE MYOMECTOMY: Primary | ICD-10-CM

## 2020-10-07 PROCEDURE — PNV: Performed by: OBSTETRICS & GYNECOLOGY

## 2020-10-10 ENCOUNTER — ANESTHESIA EVENT (INPATIENT)
Dept: LABOR AND DELIVERY | Facility: HOSPITAL | Age: 40
End: 2020-10-10
Payer: COMMERCIAL

## 2020-10-10 ENCOUNTER — ANESTHESIA (INPATIENT)
Dept: LABOR AND DELIVERY | Facility: HOSPITAL | Age: 40
End: 2020-10-10
Payer: COMMERCIAL

## 2020-10-10 ENCOUNTER — HOSPITAL ENCOUNTER (INPATIENT)
Facility: HOSPITAL | Age: 40
LOS: 3 days | Discharge: HOME | End: 2020-10-13
Attending: OBSTETRICS & GYNECOLOGY | Admitting: OBSTETRICS & GYNECOLOGY
Payer: COMMERCIAL

## 2020-10-10 ENCOUNTER — TELEPHONE (OUTPATIENT)
Dept: LABOR AND DELIVERY | Facility: HOSPITAL | Age: 40
End: 2020-10-10

## 2020-10-10 ENCOUNTER — TELEPHONE (OUTPATIENT)
Dept: OTHER | Facility: OTHER | Age: 40
End: 2020-10-10

## 2020-10-10 VITALS — HEART RATE: 82 BPM

## 2020-10-10 DIAGNOSIS — O34.29 PREGNANCY WITH HISTORY OF UTERINE MYOMECTOMY: ICD-10-CM

## 2020-10-10 DIAGNOSIS — Z3A.37 37 WEEKS GESTATION OF PREGNANCY: Primary | ICD-10-CM

## 2020-10-10 LAB
ABO GROUP BLD: NORMAL
BASE EXCESS BLDCOA CALC-SCNC: -3 MMOL/L (ref 3–11)
BASE EXCESS BLDCOV CALC-SCNC: -0.7 MMOL/L (ref 1–9)
BLD GP AB SCN SERPL QL: NEGATIVE
ERYTHROCYTE [DISTWIDTH] IN BLOOD BY AUTOMATED COUNT: 12.8 % (ref 11.6–15.1)
HCO3 BLDCOA-SCNC: 23 MMOL/L (ref 17.3–27.3)
HCO3 BLDCOV-SCNC: 24 MMOL/L (ref 12.2–28.6)
HCT VFR BLD AUTO: 42 % (ref 34.8–46.1)
HGB BLD-MCNC: 13.5 G/DL (ref 11.5–15.4)
MCH RBC QN AUTO: 30.9 PG (ref 26.8–34.3)
MCHC RBC AUTO-ENTMCNC: 32.1 G/DL (ref 31.4–37.4)
MCV RBC AUTO: 96 FL (ref 82–98)
O2 CT VFR BLDCOA CALC: 8.9 ML/DL
OXYHGB MFR BLDCOA: 45.2 %
OXYHGB MFR BLDCOV: 69.2 %
PCO2 BLDCOA: 44.7 MM[HG] (ref 30–60)
PCO2 BLDCOV: 40 MM HG (ref 27–43)
PH BLDCOA: 7.33 [PH] (ref 7.23–7.43)
PH BLDCOV: 7.4 [PH] (ref 7.19–7.49)
PLATELET # BLD AUTO: 190 THOUSANDS/UL (ref 149–390)
PMV BLD AUTO: 10.6 FL (ref 8.9–12.7)
PO2 BLDCOA: 20.2 MM HG (ref 5–25)
PO2 BLDCOV: 28 MM HG (ref 15–45)
RBC # BLD AUTO: 4.37 MILLION/UL (ref 3.81–5.12)
RH BLD: POSITIVE
SAO2 % BLDCOV: 12.5 ML/DL
SPECIMEN EXPIRATION DATE: NORMAL
WBC # BLD AUTO: 9.07 THOUSAND/UL (ref 4.31–10.16)

## 2020-10-10 PROCEDURE — NC001 PR NO CHARGE: Performed by: OBSTETRICS & GYNECOLOGY

## 2020-10-10 PROCEDURE — 94762 N-INVAS EAR/PLS OXIMTRY CONT: CPT

## 2020-10-10 PROCEDURE — 85027 COMPLETE CBC AUTOMATED: CPT | Performed by: OBSTETRICS & GYNECOLOGY

## 2020-10-10 PROCEDURE — 4A1HXCZ MONITORING OF PRODUCTS OF CONCEPTION, CARDIAC RATE, EXTERNAL APPROACH: ICD-10-PCS | Performed by: OBSTETRICS & GYNECOLOGY

## 2020-10-10 PROCEDURE — 86850 RBC ANTIBODY SCREEN: CPT | Performed by: OBSTETRICS & GYNECOLOGY

## 2020-10-10 PROCEDURE — 86901 BLOOD TYPING SEROLOGIC RH(D): CPT | Performed by: OBSTETRICS & GYNECOLOGY

## 2020-10-10 PROCEDURE — 99202 OFFICE O/P NEW SF 15 MIN: CPT

## 2020-10-10 PROCEDURE — 86900 BLOOD TYPING SEROLOGIC ABO: CPT | Performed by: OBSTETRICS & GYNECOLOGY

## 2020-10-10 PROCEDURE — 82805 BLOOD GASES W/O2 SATURATION: CPT | Performed by: OBSTETRICS & GYNECOLOGY

## 2020-10-10 PROCEDURE — 59510 CESAREAN DELIVERY: CPT | Performed by: OBSTETRICS & GYNECOLOGY

## 2020-10-10 RX ORDER — DOCUSATE SODIUM 100 MG/1
100 CAPSULE, LIQUID FILLED ORAL 2 TIMES DAILY
Status: DISCONTINUED | OUTPATIENT
Start: 2020-10-10 | End: 2020-10-13 | Stop reason: HOSPADM

## 2020-10-10 RX ORDER — OXYCODONE HYDROCHLORIDE 5 MG/1
5 TABLET ORAL EVERY 4 HOURS PRN
Status: DISCONTINUED | OUTPATIENT
Start: 2020-10-11 | End: 2020-10-13 | Stop reason: HOSPADM

## 2020-10-10 RX ORDER — LEVOTHYROXINE SODIUM 0.03 MG/1
25 TABLET ORAL
Status: DISCONTINUED | OUTPATIENT
Start: 2020-10-11 | End: 2020-10-13 | Stop reason: HOSPADM

## 2020-10-10 RX ORDER — BISACODYL 10 MG
10 SUPPOSITORY, RECTAL RECTAL DAILY PRN
Status: DISCONTINUED | OUTPATIENT
Start: 2020-10-10 | End: 2020-10-13 | Stop reason: HOSPADM

## 2020-10-10 RX ORDER — ONDANSETRON 2 MG/ML
4 INJECTION INTRAMUSCULAR; INTRAVENOUS EVERY 6 HOURS PRN
Status: DISCONTINUED | OUTPATIENT
Start: 2020-10-10 | End: 2020-10-10

## 2020-10-10 RX ORDER — KETOROLAC TROMETHAMINE 30 MG/ML
30 INJECTION, SOLUTION INTRAMUSCULAR; INTRAVENOUS EVERY 6 HOURS PRN
Status: DISPENSED | OUTPATIENT
Start: 2020-10-10 | End: 2020-10-12

## 2020-10-10 RX ORDER — KETOROLAC TROMETHAMINE 30 MG/ML
INJECTION, SOLUTION INTRAMUSCULAR; INTRAVENOUS AS NEEDED
Status: DISCONTINUED | OUTPATIENT
Start: 2020-10-10 | End: 2020-10-10

## 2020-10-10 RX ORDER — CEFAZOLIN SODIUM 1 G/50ML
1000 SOLUTION INTRAVENOUS ONCE
Status: COMPLETED | OUTPATIENT
Start: 2020-10-10 | End: 2020-10-10

## 2020-10-10 RX ORDER — OXYTOCIN/RINGER'S LACTATE 30/500 ML
62.5 PLASTIC BAG, INJECTION (ML) INTRAVENOUS CONTINUOUS
Status: ACTIVE | OUTPATIENT
Start: 2020-10-10 | End: 2020-10-10

## 2020-10-10 RX ORDER — IBUPROFEN 600 MG/1
600 TABLET ORAL EVERY 6 HOURS PRN
Status: DISCONTINUED | OUTPATIENT
Start: 2020-10-10 | End: 2020-10-13 | Stop reason: HOSPADM

## 2020-10-10 RX ORDER — FENTANYL CITRATE 50 UG/ML
INJECTION, SOLUTION INTRAMUSCULAR; INTRAVENOUS
Status: DISPENSED
Start: 2020-10-10 | End: 2020-10-11

## 2020-10-10 RX ORDER — ONDANSETRON 2 MG/ML
4 INJECTION INTRAMUSCULAR; INTRAVENOUS EVERY 4 HOURS PRN
Status: ACTIVE | OUTPATIENT
Start: 2020-10-10 | End: 2020-10-11

## 2020-10-10 RX ORDER — ONDANSETRON 2 MG/ML
4 INJECTION INTRAMUSCULAR; INTRAVENOUS ONCE AS NEEDED
Status: DISCONTINUED | OUTPATIENT
Start: 2020-10-10 | End: 2020-10-11 | Stop reason: SDUPTHER

## 2020-10-10 RX ORDER — METOCLOPRAMIDE HYDROCHLORIDE 5 MG/ML
5 INJECTION INTRAMUSCULAR; INTRAVENOUS EVERY 6 HOURS PRN
Status: ACTIVE | OUTPATIENT
Start: 2020-10-10 | End: 2020-10-11

## 2020-10-10 RX ORDER — ONDANSETRON 2 MG/ML
4 INJECTION INTRAMUSCULAR; INTRAVENOUS EVERY 6 HOURS PRN
Status: DISCONTINUED | OUTPATIENT
Start: 2020-10-10 | End: 2020-10-13 | Stop reason: HOSPADM

## 2020-10-10 RX ORDER — SODIUM CHLORIDE, SODIUM LACTATE, POTASSIUM CHLORIDE, CALCIUM CHLORIDE 600; 310; 30; 20 MG/100ML; MG/100ML; MG/100ML; MG/100ML
125 INJECTION, SOLUTION INTRAVENOUS CONTINUOUS
Status: DISCONTINUED | OUTPATIENT
Start: 2020-10-10 | End: 2020-10-13 | Stop reason: HOSPADM

## 2020-10-10 RX ORDER — MEPERIDINE HYDROCHLORIDE 25 MG/ML
12.5 INJECTION INTRAMUSCULAR; INTRAVENOUS; SUBCUTANEOUS ONCE AS NEEDED
Status: DISCONTINUED | OUTPATIENT
Start: 2020-10-10 | End: 2020-10-13 | Stop reason: HOSPADM

## 2020-10-10 RX ORDER — OXYCODONE HYDROCHLORIDE AND ACETAMINOPHEN 5; 325 MG/1; MG/1
2 TABLET ORAL EVERY 4 HOURS PRN
Status: ACTIVE | OUTPATIENT
Start: 2020-10-10 | End: 2020-10-11

## 2020-10-10 RX ORDER — NALOXONE HYDROCHLORIDE 0.4 MG/ML
0.1 INJECTION, SOLUTION INTRAMUSCULAR; INTRAVENOUS; SUBCUTANEOUS
Status: ACTIVE | OUTPATIENT
Start: 2020-10-10 | End: 2020-10-11

## 2020-10-10 RX ORDER — MORPHINE SULFATE 0.5 MG/ML
INJECTION, SOLUTION EPIDURAL; INTRATHECAL; INTRAVENOUS
Status: DISPENSED
Start: 2020-10-10 | End: 2020-10-11

## 2020-10-10 RX ORDER — CEFAZOLIN SODIUM 1 G/50ML
SOLUTION INTRAVENOUS AS NEEDED
Status: DISCONTINUED | OUTPATIENT
Start: 2020-10-10 | End: 2020-10-10

## 2020-10-10 RX ORDER — DEXAMETHASONE SODIUM PHOSPHATE 4 MG/ML
8 INJECTION, SOLUTION INTRA-ARTICULAR; INTRALESIONAL; INTRAMUSCULAR; INTRAVENOUS; SOFT TISSUE ONCE AS NEEDED
Status: ACTIVE | OUTPATIENT
Start: 2020-10-10 | End: 2020-10-11

## 2020-10-10 RX ORDER — CALCIUM CARBONATE 200(500)MG
1000 TABLET,CHEWABLE ORAL 3 TIMES DAILY PRN
Status: DISCONTINUED | OUTPATIENT
Start: 2020-10-10 | End: 2020-10-13 | Stop reason: HOSPADM

## 2020-10-10 RX ORDER — OXYCODONE HYDROCHLORIDE 10 MG/1
10 TABLET ORAL EVERY 4 HOURS PRN
Status: DISCONTINUED | OUTPATIENT
Start: 2020-10-11 | End: 2020-10-13 | Stop reason: HOSPADM

## 2020-10-10 RX ORDER — OXYTOCIN/RINGER'S LACTATE 30/500 ML
PLASTIC BAG, INJECTION (ML) INTRAVENOUS CONTINUOUS PRN
Status: DISCONTINUED | OUTPATIENT
Start: 2020-10-10 | End: 2020-10-10

## 2020-10-10 RX ORDER — DIPHENHYDRAMINE HYDROCHLORIDE 50 MG/ML
25 INJECTION INTRAMUSCULAR; INTRAVENOUS EVERY 6 HOURS PRN
Status: ACTIVE | OUTPATIENT
Start: 2020-10-10 | End: 2020-10-11

## 2020-10-10 RX ORDER — HYDROMORPHONE HCL/PF 1 MG/ML
0.5 SYRINGE (ML) INJECTION
Status: DISCONTINUED | OUTPATIENT
Start: 2020-10-10 | End: 2020-10-13 | Stop reason: HOSPADM

## 2020-10-10 RX ORDER — FENTANYL CITRATE/PF 50 MCG/ML
50 SYRINGE (ML) INJECTION
Status: DISCONTINUED | OUTPATIENT
Start: 2020-10-10 | End: 2020-10-13 | Stop reason: HOSPADM

## 2020-10-10 RX ORDER — OXYCODONE HYDROCHLORIDE AND ACETAMINOPHEN 5; 325 MG/1; MG/1
1 TABLET ORAL EVERY 4 HOURS PRN
Status: ACTIVE | OUTPATIENT
Start: 2020-10-10 | End: 2020-10-11

## 2020-10-10 RX ORDER — SIMETHICONE 80 MG
80 TABLET,CHEWABLE ORAL 4 TIMES DAILY PRN
Status: DISCONTINUED | OUTPATIENT
Start: 2020-10-10 | End: 2020-10-13 | Stop reason: HOSPADM

## 2020-10-10 RX ORDER — ACETAMINOPHEN 325 MG/1
650 TABLET ORAL EVERY 6 HOURS
Status: DISCONTINUED | OUTPATIENT
Start: 2020-10-10 | End: 2020-10-13 | Stop reason: HOSPADM

## 2020-10-10 RX ADMIN — PHENYLEPHRINE HYDROCHLORIDE 50 MCG/MIN: 10 INJECTION INTRAVENOUS at 13:21

## 2020-10-10 RX ADMIN — SODIUM CHLORIDE, SODIUM LACTATE, POTASSIUM CHLORIDE, AND CALCIUM CHLORIDE 1000 ML: .6; .31; .03; .02 INJECTION, SOLUTION INTRAVENOUS at 11:21

## 2020-10-10 RX ADMIN — Medication 62.5 MILLI-UNITS/MIN: at 15:59

## 2020-10-10 RX ADMIN — PHENYLEPHRINE HYDROCHLORIDE 50 MCG: 10 INJECTION INTRAVENOUS at 13:26

## 2020-10-10 RX ADMIN — PHENYLEPHRINE HYDROCHLORIDE 50 MCG: 10 INJECTION INTRAVENOUS at 14:06

## 2020-10-10 RX ADMIN — Medication 250 MILLI-UNITS/MIN: at 13:43

## 2020-10-10 RX ADMIN — SODIUM CHLORIDE, SODIUM LACTATE, POTASSIUM CHLORIDE, AND CALCIUM CHLORIDE 125 ML/HR: .6; .31; .03; .02 INJECTION, SOLUTION INTRAVENOUS at 20:05

## 2020-10-10 RX ADMIN — ONDANSETRON 4 MG: 2 INJECTION INTRAMUSCULAR; INTRAVENOUS at 13:49

## 2020-10-10 RX ADMIN — KETOROLAC TROMETHAMINE 30 MG: 30 INJECTION, SOLUTION INTRAMUSCULAR at 14:09

## 2020-10-10 RX ADMIN — CEFAZOLIN SODIUM 1000 MG: 1 SOLUTION INTRAVENOUS at 12:28

## 2020-10-10 RX ADMIN — KETOROLAC TROMETHAMINE 30 MG: 30 INJECTION, SOLUTION INTRAMUSCULAR; INTRAVENOUS at 20:06

## 2020-10-10 RX ADMIN — CEFAZOLIN SODIUM 1000 MG: 1 SOLUTION INTRAVENOUS at 13:05

## 2020-10-10 RX ADMIN — SODIUM CHLORIDE, SODIUM LACTATE, POTASSIUM CHLORIDE, AND CALCIUM CHLORIDE 125 ML/HR: .6; .31; .03; .02 INJECTION, SOLUTION INTRAVENOUS at 12:27

## 2020-10-11 LAB
ERYTHROCYTE [DISTWIDTH] IN BLOOD BY AUTOMATED COUNT: 12.9 % (ref 11.6–15.1)
HCT VFR BLD AUTO: 29.1 % (ref 34.8–46.1)
HGB BLD-MCNC: 9.5 G/DL (ref 11.5–15.4)
MCH RBC QN AUTO: 31.7 PG (ref 26.8–34.3)
MCHC RBC AUTO-ENTMCNC: 32.6 G/DL (ref 31.4–37.4)
MCV RBC AUTO: 97 FL (ref 82–98)
PLATELET # BLD AUTO: 135 THOUSANDS/UL (ref 149–390)
PMV BLD AUTO: 10.1 FL (ref 8.9–12.7)
RBC # BLD AUTO: 3 MILLION/UL (ref 3.81–5.12)
WBC # BLD AUTO: 12.41 THOUSAND/UL (ref 4.31–10.16)

## 2020-10-11 PROCEDURE — 94762 N-INVAS EAR/PLS OXIMTRY CONT: CPT

## 2020-10-11 PROCEDURE — 85027 COMPLETE CBC AUTOMATED: CPT | Performed by: OBSTETRICS & GYNECOLOGY

## 2020-10-11 PROCEDURE — 99024 POSTOP FOLLOW-UP VISIT: CPT | Performed by: OBSTETRICS & GYNECOLOGY

## 2020-10-11 RX ORDER — DIPHENHYDRAMINE HYDROCHLORIDE 50 MG/ML
25 INJECTION INTRAMUSCULAR; INTRAVENOUS EVERY 6 HOURS PRN
Status: DISPENSED | OUTPATIENT
Start: 2020-10-11 | End: 2020-10-12

## 2020-10-11 RX ADMIN — ACETAMINOPHEN 650 MG: 325 TABLET ORAL at 02:53

## 2020-10-11 RX ADMIN — ACETAMINOPHEN 650 MG: 325 TABLET ORAL at 17:20

## 2020-10-11 RX ADMIN — SODIUM CHLORIDE, SODIUM LACTATE, POTASSIUM CHLORIDE, AND CALCIUM CHLORIDE 125 ML/HR: .6; .31; .03; .02 INJECTION, SOLUTION INTRAVENOUS at 01:40

## 2020-10-11 RX ADMIN — IBUPROFEN 600 MG: 600 TABLET, FILM COATED ORAL at 05:14

## 2020-10-11 RX ADMIN — KETOROLAC TROMETHAMINE 30 MG: 30 INJECTION, SOLUTION INTRAMUSCULAR; INTRAVENOUS at 10:02

## 2020-10-11 RX ADMIN — LEVOTHYROXINE SODIUM 25 MCG: 25 TABLET ORAL at 06:14

## 2020-10-11 RX ADMIN — DOCUSATE SODIUM 100 MG: 100 CAPSULE, LIQUID FILLED ORAL at 17:21

## 2020-10-11 RX ADMIN — DIPHENHYDRAMINE HYDROCHLORIDE 25 MG: 50 INJECTION, SOLUTION INTRAMUSCULAR; INTRAVENOUS at 21:43

## 2020-10-11 RX ADMIN — DOCUSATE SODIUM 100 MG: 100 CAPSULE, LIQUID FILLED ORAL at 09:53

## 2020-10-11 RX ADMIN — ACETAMINOPHEN 650 MG: 325 TABLET ORAL at 22:27

## 2020-10-11 RX ADMIN — ACETAMINOPHEN 650 MG: 325 TABLET ORAL at 09:53

## 2020-10-12 PROCEDURE — 99024 POSTOP FOLLOW-UP VISIT: CPT | Performed by: STUDENT IN AN ORGANIZED HEALTH CARE EDUCATION/TRAINING PROGRAM

## 2020-10-12 RX ADMIN — IBUPROFEN 600 MG: 600 TABLET, FILM COATED ORAL at 13:12

## 2020-10-12 RX ADMIN — ACETAMINOPHEN 650 MG: 325 TABLET ORAL at 05:12

## 2020-10-12 RX ADMIN — LEVOTHYROXINE SODIUM 25 MCG: 25 TABLET ORAL at 05:12

## 2020-10-12 RX ADMIN — ACETAMINOPHEN 650 MG: 325 TABLET ORAL at 23:22

## 2020-10-12 RX ADMIN — OXYCODONE HYDROCHLORIDE 5 MG: 5 TABLET ORAL at 14:50

## 2020-10-12 RX ADMIN — ACETAMINOPHEN 650 MG: 325 TABLET ORAL at 16:18

## 2020-10-12 RX ADMIN — ACETAMINOPHEN 650 MG: 325 TABLET ORAL at 11:06

## 2020-10-12 RX ADMIN — OXYCODONE HYDROCHLORIDE 5 MG: 5 TABLET ORAL at 01:07

## 2020-10-12 RX ADMIN — DOCUSATE SODIUM 100 MG: 100 CAPSULE, LIQUID FILLED ORAL at 18:37

## 2020-10-12 RX ADMIN — DOCUSATE SODIUM 100 MG: 100 CAPSULE, LIQUID FILLED ORAL at 09:24

## 2020-10-13 VITALS
RESPIRATION RATE: 18 BRPM | OXYGEN SATURATION: 97 % | WEIGHT: 120 LBS | HEIGHT: 63 IN | SYSTOLIC BLOOD PRESSURE: 99 MMHG | TEMPERATURE: 98.2 F | HEART RATE: 80 BPM | BODY MASS INDEX: 21.26 KG/M2 | DIASTOLIC BLOOD PRESSURE: 66 MMHG

## 2020-10-13 PROCEDURE — 99024 POSTOP FOLLOW-UP VISIT: CPT | Performed by: OBSTETRICS & GYNECOLOGY

## 2020-10-13 RX ORDER — ACETAMINOPHEN 325 MG/1
650 TABLET ORAL EVERY 6 HOURS PRN
Qty: 60 TABLET | Refills: 1 | Status: SHIPPED | OUTPATIENT
Start: 2020-10-13 | End: 2020-11-17

## 2020-10-13 RX ORDER — IBUPROFEN 600 MG/1
600 TABLET ORAL EVERY 6 HOURS PRN
Qty: 30 TABLET | Refills: 0 | Status: SHIPPED | OUTPATIENT
Start: 2020-10-13 | End: 2020-11-17

## 2020-10-13 RX ORDER — OXYCODONE HYDROCHLORIDE 5 MG/1
5 TABLET ORAL EVERY 6 HOURS PRN
Qty: 8 TABLET | Refills: 0 | Status: SHIPPED | OUTPATIENT
Start: 2020-10-13 | End: 2020-10-23

## 2020-10-13 RX ADMIN — ACETAMINOPHEN 650 MG: 325 TABLET ORAL at 05:24

## 2020-10-13 RX ADMIN — OXYCODONE HYDROCHLORIDE 10 MG: 10 TABLET ORAL at 12:35

## 2020-10-13 RX ADMIN — LEVOTHYROXINE SODIUM 25 MCG: 25 TABLET ORAL at 05:24

## 2020-10-13 RX ADMIN — DOCUSATE SODIUM 100 MG: 100 CAPSULE, LIQUID FILLED ORAL at 08:58

## 2020-10-13 RX ADMIN — ACETAMINOPHEN 650 MG: 325 TABLET ORAL at 11:39

## 2020-10-18 LAB — PLACENTA IN STORAGE: NORMAL

## 2020-10-19 ENCOUNTER — OFFICE VISIT (OUTPATIENT)
Dept: OBGYN CLINIC | Facility: MEDICAL CENTER | Age: 40
End: 2020-10-19

## 2020-10-19 VITALS
SYSTOLIC BLOOD PRESSURE: 84 MMHG | BODY MASS INDEX: 19.49 KG/M2 | DIASTOLIC BLOOD PRESSURE: 60 MMHG | HEIGHT: 63 IN | WEIGHT: 110 LBS

## 2020-10-19 DIAGNOSIS — Z98.890 POSTOPERATIVE STATE: Primary | ICD-10-CM

## 2020-10-19 PROCEDURE — 99024 POSTOP FOLLOW-UP VISIT: CPT | Performed by: OBSTETRICS & GYNECOLOGY

## 2020-11-17 ENCOUNTER — POSTPARTUM VISIT (OUTPATIENT)
Dept: OBGYN CLINIC | Facility: MEDICAL CENTER | Age: 40
End: 2020-11-17

## 2020-11-17 VITALS
TEMPERATURE: 98.1 F | BODY MASS INDEX: 20.38 KG/M2 | WEIGHT: 115 LBS | HEIGHT: 63 IN | SYSTOLIC BLOOD PRESSURE: 100 MMHG | DIASTOLIC BLOOD PRESSURE: 60 MMHG

## 2020-11-17 DIAGNOSIS — Z98.891 S/P CESAREAN SECTION: ICD-10-CM

## 2020-11-17 DIAGNOSIS — Z09 POSTOPERATIVE EXAMINATION: Primary | ICD-10-CM

## 2020-11-17 DIAGNOSIS — E03.9 HYPOTHYROIDISM, UNSPECIFIED TYPE: ICD-10-CM

## 2020-11-17 DIAGNOSIS — D64.9 POSTOPERATIVE ANEMIA: ICD-10-CM

## 2020-11-17 PROBLEM — E03.8 OTHER SPECIFIED HYPOTHYROIDISM: Status: ACTIVE | Noted: 2020-04-15

## 2020-11-17 PROCEDURE — 99024 POSTOP FOLLOW-UP VISIT: CPT | Performed by: OBSTETRICS & GYNECOLOGY

## 2021-02-18 DIAGNOSIS — O99.282 HYPOTHYROIDISM AFFECTING PREGNANCY IN SECOND TRIMESTER: ICD-10-CM

## 2021-02-18 DIAGNOSIS — E03.9 HYPOTHYROIDISM AFFECTING PREGNANCY IN SECOND TRIMESTER: ICD-10-CM

## 2021-02-18 RX ORDER — LEVOTHYROXINE SODIUM 0.03 MG/1
TABLET ORAL
Qty: 30 TABLET | Refills: 6 | Status: SHIPPED | OUTPATIENT
Start: 2021-02-18 | End: 2021-03-02 | Stop reason: SDUPTHER

## 2021-03-02 DIAGNOSIS — O99.282 HYPOTHYROIDISM AFFECTING PREGNANCY IN SECOND TRIMESTER: ICD-10-CM

## 2021-03-02 DIAGNOSIS — E03.9 HYPOTHYROIDISM AFFECTING PREGNANCY IN SECOND TRIMESTER: ICD-10-CM

## 2021-03-03 RX ORDER — LEVOTHYROXINE SODIUM 0.03 MG/1
25 TABLET ORAL DAILY
Qty: 30 TABLET | Refills: 6 | Status: SHIPPED | OUTPATIENT
Start: 2021-03-03 | End: 2021-09-22 | Stop reason: SDUPTHER

## 2021-09-20 ENCOUNTER — OFFICE VISIT (OUTPATIENT)
Dept: OBGYN CLINIC | Facility: MEDICAL CENTER | Age: 41
End: 2021-09-20
Payer: COMMERCIAL

## 2021-09-20 VITALS
BODY MASS INDEX: 17.54 KG/M2 | SYSTOLIC BLOOD PRESSURE: 90 MMHG | HEIGHT: 63 IN | DIASTOLIC BLOOD PRESSURE: 60 MMHG | WEIGHT: 99 LBS

## 2021-09-20 DIAGNOSIS — Z78.9 ATTEMPTING TO CONCEIVE: ICD-10-CM

## 2021-09-20 DIAGNOSIS — E03.9 HYPOTHYROIDISM AFFECTING PREGNANCY IN SECOND TRIMESTER: ICD-10-CM

## 2021-09-20 DIAGNOSIS — D25.9 UTERINE LEIOMYOMA, UNSPECIFIED LOCATION: ICD-10-CM

## 2021-09-20 DIAGNOSIS — Z01.419 ENCOUNTER FOR WELL WOMAN EXAM WITH ROUTINE GYNECOLOGICAL EXAM: Primary | ICD-10-CM

## 2021-09-20 DIAGNOSIS — Z98.890 HISTORY OF MYOMECTOMY: ICD-10-CM

## 2021-09-20 DIAGNOSIS — Z12.31 BREAST CANCER SCREENING BY MAMMOGRAM: ICD-10-CM

## 2021-09-20 DIAGNOSIS — O99.282 HYPOTHYROIDISM AFFECTING PREGNANCY IN SECOND TRIMESTER: ICD-10-CM

## 2021-09-20 DIAGNOSIS — E03.9 HYPOTHYROIDISM, UNSPECIFIED TYPE: ICD-10-CM

## 2021-09-20 DIAGNOSIS — R63.6 UNDERWEIGHT: ICD-10-CM

## 2021-09-20 PROCEDURE — 99396 PREV VISIT EST AGE 40-64: CPT | Performed by: OBSTETRICS & GYNECOLOGY

## 2021-09-20 NOTE — PROGRESS NOTES
Assessment   37 y o   with irregular menses secondary to breastfeeding who is sexually active and currently not using contraception presenting for annual exam      Plan   Diagnoses and all orders for this visit:    Encounter for well woman exam with routine gynecological exam  -     Pap up to date  - Mammo screening bilateral w 3d & cad; Future  - Discussed goals of TTC shortly  - Return in 1yr for yearly    Breast cancer screening by mammogram  - Reviewed screening protocols and patients own risk factors  - Will consider timing around breastfeeding cessation  Mammo slip provided for her consideration    Attempting to conceive  History of myomectomy  AMA  Underweight  -     May consider TTC shortly after their blaze first birthday  Discussed optimum birth spacing and that this is appropriate  - Ambulatory Referral to Maternal Fetal Medicine; Future  -     US pelvis complete w transvaginal; Future  -     CBC and Platelet; Future  -     Basic metabolic panel; Future    Uterine leiomyoma, unspecified location  -     Ambulatory Referral to Maternal Fetal Medicine; Future  -     US pelvis complete w transvaginal; Future    Hypothyroidism, unspecified type  -     TSH, 3rd generation with Free T4 reflex; Future  -     Ambulatory Referral to Maternal Fetal Medicine; Future      __________________________________________________________________      Subjective     Silvia Lindo is a 37 y o  Lexi Mungo with irregular menses secondary to breastfeeding who is sexually active and currently not using contraception presenting for annual exam      She notes some left side incisional firmness at c/s incision  Also notes she is considering TTC and would like to get ready for this  She notes fibroids have not been assessed since pregnancy  She also feels she had a great relationship and plan with Dr Amber Gramajo and would like to meet preconceptionally to discuss any concerns regarding her high risk status      GYN  Complaints: denies  Denies dyspareunia, genital discharge, genital ulcers, pelvic pain and vulvar/vaginal symptoms  Last month was first menses since baby  Breastfeeding still, 4x daily and considering weaning  Dysmenorrhea:none  Cyclic symptoms include none  Sexually active: Yes - single partner - male  Hx STI: denies   Hx Abnormal pap: denies  Last pap: 2019 - NILM, no HPV    OB     Pregnancy complications: fibroids with hx myomectomy and planned , AMA      Complaints: denies  Denies urinary frequency, hematuria, urinary incontinence and dysuria    BREAST  Pumping 4x/day  Complaints: denies apart from normal lactation  Denies: breast lump, breast tenderness, changed mole, dryness, nipple discharge, pruritus, rash, skin color change and skin lesion(s)  Last mammogram: n/a  Personal hx: denies  Family hx: denies fhx of breast, uterine, ovarian, or colon cancers  Patient does do regular self-exams    GENERAL  PMH reviewed/updated and is as below  Patient does not follow with a PCP  Stay at home mom  Exercise: yoga, cycling  Diet: nothing specific    SCREENING  Cervical Ca: pap up to date  Breast Ca: discussed mammogram screening protocols, breastfeeding, and family hx  Based on history, she is average risk and will likely defer until after breastfeeding      Past Medical History:   Diagnosis Date    Disease of thyroid gland 2019    High level of TSH    Fibroid     Hypothyroidism affecting pregnancy in first trimester 4/15/2020    Miscarriage     2006, early, did not require surgery  Past Surgical History:   Procedure Laterality Date    APPENDECTOMY      2004    MYOMECTOMY ABDOMINAL APPROACH  2019    In [facility] on 19  Working on getting operative note and will try to get it translated to [place]  Indicates pathology negative for carcinoma  Symptoms were enlarging abdomen and bulk symptoms    Fibroid measurements on ultrasound image they showed me were 5 59x7 39cm though they believe dimensions >10cm  They were told to wait 6 months to conceive by one doctor and 12 months by another   [place]  DELIVERY ONLY N/A 10/10/2020    Procedure:  SECTION (); Surgeon: Frieda Randall MD;  Location: [facility];  Service: Obstetrics         Current Outpatient Medications:     levothyroxine 25 mcg tablet, Take 1 tablet (25 mcg total) by mouth daily, Disp: 30 tablet, Rfl: 6    Prenatal Vit-Fe Fumarate-FA (PRENATAL VITAMIN PO), Take 1 tablet by mouth daily, Disp: , Rfl:     No Known Allergies    Social History     Tobacco Use    Smoking status: Never Smoker    Smokeless tobacco: Never Used   Vaping Use    Vaping Use: Never used   Substance Use Topics    Alcohol use: Not Currently     Comment: Used to be a social drinker, but not during pregnancy    Drug use: Never           Objective  BP 90/60   Ht 5' 3" (1 6 m)   Wt 44 9 kg (99 lb)   LMP 2021   Breastfeeding Yes   BMI 17 54 kg/m²      Physical Exam:  Physical Exam  Exam conducted with a chaperone present  Constitutional:       General: She is not in acute distress  Appearance: Normal appearance  She is well-developed  She is not ill-appearing, toxic-appearing or diaphoretic  HENT:      Head: Normocephalic and atraumatic  Eyes:      General: No scleral icterus  Right eye: No discharge  Left eye: No discharge  Conjunctiva/sclera: Conjunctivae normal    Cardiovascular:      Rate and Rhythm: Normal rate  Pulmonary:      Effort: Pulmonary effort is normal  No accessory muscle usage or respiratory distress  Chest:      Breasts:         Right: No inverted nipple, mass, nipple discharge, skin change or tenderness  Left: No inverted nipple, mass, nipple discharge, skin change or tenderness  Abdominal:      General: There is no distension  Palpations: Abdomen is soft  There is no mass  Tenderness: There is no abdominal tenderness  There is no guarding or rebound  Genitourinary:     General: Normal vulva  Exam position: Lithotomy position  Labia:         Right: No rash, tenderness or lesion  Left: No rash, tenderness or lesion  Urethra: No prolapse, urethral swelling or urethral lesion  Vagina: No signs of injury  No vaginal discharge, erythema, tenderness or bleeding  Cervix: No cervical motion tenderness, discharge, friability, lesion or erythema  Uterus: Enlarged  Not deviated, not fixed and not tender  Adnexa:         Right: No mass, tenderness or fullness  Left: No mass, tenderness or fullness  Rectum: No external hemorrhoid  Normal anal tone  Comments: Urethral meatus: normal  Skin:     General: Skin is warm and dry  Coloration: Skin is not jaundiced  Findings: No bruising, erythema or rash  Neurological:      Mental Status: She is alert  Psychiatric:         Mood and Affect: Mood normal          Behavior: Behavior normal          Thought Content:  Thought content normal          Judgment: Judgment normal

## 2021-09-22 ENCOUNTER — PATIENT MESSAGE (OUTPATIENT)
Dept: OBGYN CLINIC | Facility: MEDICAL CENTER | Age: 41
End: 2021-09-22

## 2021-09-22 RX ORDER — LEVOTHYROXINE SODIUM 0.03 MG/1
25 TABLET ORAL DAILY
Qty: 30 TABLET | Refills: 6 | Status: SHIPPED | OUTPATIENT
Start: 2021-09-22 | End: 2022-04-27

## 2021-10-02 ENCOUNTER — APPOINTMENT (OUTPATIENT)
Dept: LAB | Facility: HOSPITAL | Age: 41
End: 2021-10-02
Payer: COMMERCIAL

## 2021-10-02 DIAGNOSIS — E03.9 HYPOTHYROIDISM, UNSPECIFIED TYPE: ICD-10-CM

## 2021-10-02 DIAGNOSIS — R63.6 UNDERWEIGHT: ICD-10-CM

## 2021-10-02 LAB
ANION GAP SERPL CALCULATED.3IONS-SCNC: 10 MMOL/L (ref 4–13)
BUN SERPL-MCNC: 15 MG/DL (ref 5–25)
CALCIUM SERPL-MCNC: 8.9 MG/DL (ref 8.3–10.1)
CHLORIDE SERPL-SCNC: 105 MMOL/L (ref 100–108)
CO2 SERPL-SCNC: 27 MMOL/L (ref 21–32)
CREAT SERPL-MCNC: 0.52 MG/DL (ref 0.6–1.3)
ERYTHROCYTE [DISTWIDTH] IN BLOOD BY AUTOMATED COUNT: 13 % (ref 11.6–15.1)
GFR SERPL CREATININE-BSD FRML MDRD: 120 ML/MIN/1.73SQ M
GLUCOSE P FAST SERPL-MCNC: 89 MG/DL (ref 65–99)
HCT VFR BLD AUTO: 38.8 % (ref 34.8–46.1)
HGB BLD-MCNC: 12.5 G/DL (ref 11.5–15.4)
MCH RBC QN AUTO: 29.6 PG (ref 26.8–34.3)
MCHC RBC AUTO-ENTMCNC: 32.2 G/DL (ref 31.4–37.4)
MCV RBC AUTO: 92 FL (ref 82–98)
PLATELET # BLD AUTO: 208 THOUSANDS/UL (ref 149–390)
PMV BLD AUTO: 10.5 FL (ref 8.9–12.7)
POTASSIUM SERPL-SCNC: 3.6 MMOL/L (ref 3.5–5.3)
RBC # BLD AUTO: 4.23 MILLION/UL (ref 3.81–5.12)
SODIUM SERPL-SCNC: 142 MMOL/L (ref 136–145)
TSH SERPL DL<=0.05 MIU/L-ACNC: 2.64 UIU/ML (ref 0.36–3.74)
WBC # BLD AUTO: 6.37 THOUSAND/UL (ref 4.31–10.16)

## 2021-10-02 PROCEDURE — 84443 ASSAY THYROID STIM HORMONE: CPT

## 2021-10-02 PROCEDURE — 85027 COMPLETE CBC AUTOMATED: CPT

## 2021-10-02 PROCEDURE — 36415 COLL VENOUS BLD VENIPUNCTURE: CPT

## 2021-10-02 PROCEDURE — 80048 BASIC METABOLIC PNL TOTAL CA: CPT

## 2021-10-10 PROBLEM — D25.9 UTERINE LEIOMYOMA: Status: ACTIVE | Noted: 2021-10-10

## 2021-10-10 PROBLEM — Z98.890 HISTORY OF MYOMECTOMY: Status: ACTIVE | Noted: 2021-10-10

## 2021-10-10 PROBLEM — Z98.891 HISTORY OF CESAREAN DELIVERY: Status: ACTIVE | Noted: 2021-10-10

## 2021-10-11 ENCOUNTER — HOSPITAL ENCOUNTER (OUTPATIENT)
Dept: RADIOLOGY | Facility: HOSPITAL | Age: 41
Discharge: HOME | End: 2021-10-11
Attending: OBSTETRICS & GYNECOLOGY
Payer: COMMERCIAL

## 2021-10-11 ENCOUNTER — CONSULT (OUTPATIENT)
Dept: PERINATAL CARE | Facility: CLINIC | Age: 41
End: 2021-10-11
Payer: COMMERCIAL

## 2021-10-11 VITALS
WEIGHT: 99 LBS | DIASTOLIC BLOOD PRESSURE: 55 MMHG | SYSTOLIC BLOOD PRESSURE: 107 MMHG | BODY MASS INDEX: 17.54 KG/M2 | HEART RATE: 71 BPM | HEIGHT: 63 IN

## 2021-10-11 DIAGNOSIS — E03.8 OTHER SPECIFIED HYPOTHYROIDISM: ICD-10-CM

## 2021-10-11 DIAGNOSIS — Z98.891 HISTORY OF CESAREAN DELIVERY: Primary | ICD-10-CM

## 2021-10-11 DIAGNOSIS — D25.9 UTERINE LEIOMYOMA, UNSPECIFIED LOCATION: ICD-10-CM

## 2021-10-11 DIAGNOSIS — R63.6 UNDERWEIGHT: ICD-10-CM

## 2021-10-11 DIAGNOSIS — Z98.890 HISTORY OF MYOMECTOMY: ICD-10-CM

## 2021-10-11 DIAGNOSIS — O09.519 ADVANCED MATERNAL AGE, PRIMIGRAVIDA, ANTEPARTUM: ICD-10-CM

## 2021-10-11 DIAGNOSIS — O34.29 PREGNANCY WITH HISTORY OF UTERINE MYOMECTOMY: ICD-10-CM

## 2021-10-11 DIAGNOSIS — Z31.69 ENCOUNTER FOR PRECONCEPTION CONSULTATION: ICD-10-CM

## 2021-10-11 PROCEDURE — 76856 US EXAM PELVIC COMPLETE: CPT

## 2021-10-11 PROCEDURE — 99215 OFFICE O/P EST HI 40 MIN: CPT | Performed by: OBSTETRICS & GYNECOLOGY

## 2021-10-11 PROCEDURE — 76830 TRANSVAGINAL US NON-OB: CPT

## 2022-04-26 DIAGNOSIS — O99.282 HYPOTHYROIDISM AFFECTING PREGNANCY IN SECOND TRIMESTER: ICD-10-CM

## 2022-04-26 DIAGNOSIS — E03.9 HYPOTHYROIDISM AFFECTING PREGNANCY IN SECOND TRIMESTER: ICD-10-CM

## 2022-04-27 RX ORDER — LEVOTHYROXINE SODIUM 0.03 MG/1
TABLET ORAL
Qty: 30 TABLET | Refills: 3 | Status: SHIPPED | OUTPATIENT
Start: 2022-04-27 | End: 2022-07-27

## 2024-10-09 NOTE — TELEPHONE ENCOUNTER
"Subjective   Patient ID: Dario Cox is a 32 y.o. female. They present today with a chief complaint of Asthma (Patient states having heaviness in chest due to asthma. ).    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 She has never used smokeless tobacco. She reports current alcohol use.    Review of Systems  Review of Systems   Constitutional: Negative.    HENT: Negative.     Respiratory:  Positive for chest tightness.    Cardiovascular: Negative.                                   Objective    Vitals:    10/09/24 1707   BP: 113/74   BP Location: Left arm   Patient Position: Sitting   BP Cuff Size: Adult   Pulse: 69   Temp: 36.8 °C (98.2 °F)   Weight: 84.4 kg (186 lb)   Height: 1.626 m (5' 4\")     Patient's last menstrual period was 09/22/2024.    Physical Exam  Cardiovascular:      Rate and Rhythm: Normal rate and regular rhythm.   Pulmonary:      Effort: Pulmonary effort is normal.      Breath sounds: Normal breath sounds.   Musculoskeletal:      Cervical back: Normal range of motion and neck supple.         Procedures    Point of Care Test & Imaging Results from this visit  No results found for this visit on 10/09/24.   No results found.    Diagnostic study results (if any) were reviewed by Megan Pittman MD.    Assessment/Plan   Allergies, medications, history, and pertinent labs/EKGs/Imaging reviewed by Megan Pittman MD.     Medical Decision Making      Orders and Diagnoses  There are no diagnoses linked to this encounter.    Medical Admin " Please call patient and encourage to complete TSH/CBC  Refill provided for levothyroxine, but sometimes requirements change after pregnancy so dose might need to be changed  Record      Patient disposition: Home    Electronically signed by Megan Pittman MD  5:21 PM

## (undated) DEVICE — ABG MICROSTICKS SAFETY

## (undated) DEVICE — CHLORAPREP HI-LITE 26ML ORANGE

## (undated) DEVICE — SCD SEQUENTIAL COMPRESSION COMFORT SLEEVE MEDIUM KNEE LENGTH: Brand: KENDALL SCD

## (undated) DEVICE — GLOVE SRG BIOGEL ECLIPSE 8

## (undated) DEVICE — SUT VICRYL 0 CTX 36 IN J978H

## (undated) DEVICE — PACK C-SECTION PBDS

## (undated) DEVICE — ASTOUND STANDARD SURGICAL GOWN, XXL: Brand: CONVERTORS

## (undated) DEVICE — GLOVE INDICATOR PI UNDERGLOVE SZ 8 BLUE